# Patient Record
Sex: MALE | Race: BLACK OR AFRICAN AMERICAN | NOT HISPANIC OR LATINO | Employment: PART TIME | URBAN - METROPOLITAN AREA
[De-identification: names, ages, dates, MRNs, and addresses within clinical notes are randomized per-mention and may not be internally consistent; named-entity substitution may affect disease eponyms.]

---
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
## 2020-12-16 ENCOUNTER — LAB VISIT (OUTPATIENT)
Dept: LAB | Facility: HOSPITAL | Age: 20
End: 2020-12-16
Attending: PEDIATRICS
Payer: MEDICAID

## 2020-12-16 DIAGNOSIS — Z20.89 CONTACT WITH OR EXPOSURE TO POLIOMYELITIS: Primary | ICD-10-CM

## 2020-12-16 DIAGNOSIS — R53.83 FATIGUE: ICD-10-CM

## 2020-12-16 LAB
ALBUMIN SERPL BCP-MCNC: 4.9 G/DL (ref 3.5–5.2)
ALP SERPL-CCNC: 49 U/L (ref 55–135)
ALT SERPL W/O P-5'-P-CCNC: 15 U/L (ref 10–44)
ANION GAP SERPL CALC-SCNC: 9 MMOL/L (ref 8–16)
AST SERPL-CCNC: 19 U/L (ref 10–40)
BASOPHILS # BLD AUTO: 0.01 K/UL (ref 0–0.2)
BASOPHILS NFR BLD: 0.1 % (ref 0–1.9)
BILIRUB SERPL-MCNC: 1.6 MG/DL (ref 0.1–1)
BUN SERPL-MCNC: 10 MG/DL (ref 6–20)
CALCIUM SERPL-MCNC: 9.6 MG/DL (ref 8.7–10.5)
CHLORIDE SERPL-SCNC: 104 MMOL/L (ref 95–110)
CO2 SERPL-SCNC: 27 MMOL/L (ref 23–29)
CREAT SERPL-MCNC: 1.1 MG/DL (ref 0.5–1.4)
DIFFERENTIAL METHOD: ABNORMAL
EOSINOPHIL # BLD AUTO: 0 K/UL (ref 0–0.5)
EOSINOPHIL NFR BLD: 0 % (ref 0–8)
ERYTHROCYTE [DISTWIDTH] IN BLOOD BY AUTOMATED COUNT: 12.2 % (ref 11.5–14.5)
EST. GFR  (AFRICAN AMERICAN): >60 ML/MIN/1.73 M^2
EST. GFR  (NON AFRICAN AMERICAN): >60 ML/MIN/1.73 M^2
ESTIMATED AVG GLUCOSE: 105 MG/DL (ref 68–131)
GLUCOSE SERPL-MCNC: 102 MG/DL (ref 70–110)
HBA1C MFR BLD HPLC: 5.3 % (ref 4–5.6)
HCT VFR BLD AUTO: 47.8 % (ref 40–54)
HGB BLD-MCNC: 15.3 G/DL (ref 14–18)
IMM GRANULOCYTES # BLD AUTO: 0.03 K/UL (ref 0–0.04)
IMM GRANULOCYTES NFR BLD AUTO: 0.4 % (ref 0–0.5)
LYMPHOCYTES # BLD AUTO: 0.8 K/UL (ref 1–4.8)
LYMPHOCYTES NFR BLD: 11.8 % (ref 18–48)
MCH RBC QN AUTO: 28.5 PG (ref 27–31)
MCHC RBC AUTO-ENTMCNC: 32 G/DL (ref 32–36)
MCV RBC AUTO: 89 FL (ref 82–98)
MONOCYTES # BLD AUTO: 0.2 K/UL (ref 0.3–1)
MONOCYTES NFR BLD: 2.4 % (ref 4–15)
NEUTROPHILS # BLD AUTO: 6 K/UL (ref 1.8–7.7)
NEUTROPHILS NFR BLD: 85.3 % (ref 38–73)
NRBC BLD-RTO: 0 /100 WBC
PLATELET # BLD AUTO: 275 K/UL (ref 150–350)
PMV BLD AUTO: 9.1 FL (ref 9.2–12.9)
POTASSIUM SERPL-SCNC: 3.9 MMOL/L (ref 3.5–5.1)
PROT SERPL-MCNC: 7.9 G/DL (ref 6–8.4)
RBC # BLD AUTO: 5.37 M/UL (ref 4.6–6.2)
SODIUM SERPL-SCNC: 140 MMOL/L (ref 136–145)
WBC # BLD AUTO: 7.03 K/UL (ref 3.9–12.7)

## 2020-12-16 PROCEDURE — 36415 COLL VENOUS BLD VENIPUNCTURE: CPT

## 2020-12-16 PROCEDURE — 86696 HERPES SIMPLEX TYPE 2 TEST: CPT

## 2020-12-16 PROCEDURE — 86704 HEP B CORE ANTIBODY TOTAL: CPT

## 2020-12-16 PROCEDURE — 80053 COMPREHEN METABOLIC PANEL: CPT

## 2020-12-16 PROCEDURE — 86803 HEPATITIS C AB TEST: CPT

## 2020-12-16 PROCEDURE — 87086 URINE CULTURE/COLONY COUNT: CPT

## 2020-12-16 PROCEDURE — 87340 HEPATITIS B SURFACE AG IA: CPT

## 2020-12-16 PROCEDURE — 86694 HERPES SIMPLEX NES ANTBDY: CPT

## 2020-12-16 PROCEDURE — 83036 HEMOGLOBIN GLYCOSYLATED A1C: CPT

## 2020-12-16 PROCEDURE — 85025 COMPLETE CBC W/AUTO DIFF WBC: CPT

## 2020-12-16 PROCEDURE — 86703 HIV-1/HIV-2 1 RESULT ANTBDY: CPT

## 2020-12-16 PROCEDURE — 86592 SYPHILIS TEST NON-TREP QUAL: CPT

## 2020-12-17 LAB
HBV CORE AB SERPL QL IA: NEGATIVE
HBV SURFACE AG SERPL QL IA: NEGATIVE
HCV AB SERPL QL IA: NEGATIVE
HIV 1+2 AB+HIV1 P24 AG SERPL QL IA: NEGATIVE
RPR SER QL: NORMAL

## 2020-12-18 LAB
BACTERIA UR CULT: NO GROWTH
HSV1 IGG SERPL QL IA: NEGATIVE
HSV2 IGG SERPL QL IA: NEGATIVE

## 2020-12-21 LAB — HSV AB, IGM BY EIA: 0.22 INDEX

## 2022-01-06 ENCOUNTER — HOSPITAL ENCOUNTER (INPATIENT)
Facility: HOSPITAL | Age: 22
LOS: 5 days | Discharge: HOME | DRG: 885 | End: 2022-01-11
Attending: PSYCHIATRY & NEUROLOGY | Admitting: STUDENT IN AN ORGANIZED HEALTH CARE EDUCATION/TRAINING PROGRAM
Payer: MEDICAID

## 2022-01-06 ENCOUNTER — HOSPITAL ENCOUNTER (EMERGENCY)
Facility: HOSPITAL | Age: 22
Discharge: PSYCHIATRIC OR SUBSTANCE USE FACILITY | End: 2022-01-06
Attending: EMERGENCY MEDICINE
Payer: MEDICAID

## 2022-01-06 VITALS
TEMPERATURE: 99 F | BODY MASS INDEX: 26.07 KG/M2 | DIASTOLIC BLOOD PRESSURE: 87 MMHG | RESPIRATION RATE: 17 BRPM | SYSTOLIC BLOOD PRESSURE: 134 MMHG | OXYGEN SATURATION: 96 % | HEART RATE: 74 BPM | HEIGHT: 68 IN | WEIGHT: 172 LBS

## 2022-01-06 DIAGNOSIS — R45.851 SUICIDAL IDEATION: Primary | ICD-10-CM

## 2022-01-06 DIAGNOSIS — F32.A DEPRESSION, UNSPECIFIED DEPRESSION TYPE: ICD-10-CM

## 2022-01-06 DIAGNOSIS — R45.851 SUICIDAL IDEATION: ICD-10-CM

## 2022-01-06 LAB
ALBUMIN SERPL BCP-MCNC: 4.8 G/DL (ref 3.5–5.2)
ALP SERPL-CCNC: 54 U/L (ref 55–135)
ALT SERPL W/O P-5'-P-CCNC: 20 U/L (ref 10–44)
AMPHET+METHAMPHET UR QL: NEGATIVE
ANION GAP SERPL CALC-SCNC: 8 MMOL/L (ref 8–16)
APAP SERPL-MCNC: <3 UG/ML (ref 10–20)
AST SERPL-CCNC: 20 U/L (ref 10–40)
BACTERIA #/AREA URNS HPF: NORMAL /HPF
BARBITURATES UR QL SCN>200 NG/ML: NEGATIVE
BASOPHILS # BLD AUTO: 0.02 K/UL (ref 0–0.2)
BASOPHILS NFR BLD: 0.3 % (ref 0–1.9)
BENZODIAZ UR QL SCN>200 NG/ML: NEGATIVE
BILIRUB SERPL-MCNC: 1.5 MG/DL (ref 0.1–1)
BILIRUB UR QL STRIP: NEGATIVE
BUN SERPL-MCNC: 11 MG/DL (ref 6–20)
BZE UR QL SCN: NEGATIVE
CALCIUM SERPL-MCNC: 9.8 MG/DL (ref 8.7–10.5)
CANNABINOIDS UR QL SCN: ABNORMAL
CHLORIDE SERPL-SCNC: 106 MMOL/L (ref 95–110)
CLARITY UR: CLEAR
CO2 SERPL-SCNC: 27 MMOL/L (ref 23–29)
COLOR UR: YELLOW
CREAT SERPL-MCNC: 1 MG/DL (ref 0.5–1.4)
CREAT UR-MCNC: >450 MG/DL (ref 23–375)
CTP QC/QA: YES
DIFFERENTIAL METHOD: ABNORMAL
EOSINOPHIL # BLD AUTO: 0 K/UL (ref 0–0.5)
EOSINOPHIL NFR BLD: 0.1 % (ref 0–8)
ERYTHROCYTE [DISTWIDTH] IN BLOOD BY AUTOMATED COUNT: 12.4 % (ref 11.5–14.5)
EST. GFR  (AFRICAN AMERICAN): >60 ML/MIN/1.73 M^2
EST. GFR  (NON AFRICAN AMERICAN): >60 ML/MIN/1.73 M^2
ETHANOL SERPL-MCNC: <10 MG/DL
GLUCOSE SERPL-MCNC: 95 MG/DL (ref 70–110)
GLUCOSE UR QL STRIP: NEGATIVE
HCT VFR BLD AUTO: 43 % (ref 40–54)
HGB BLD-MCNC: 14 G/DL (ref 14–18)
HGB UR QL STRIP: NEGATIVE
HYALINE CASTS #/AREA URNS LPF: NORMAL /LPF
IMM GRANULOCYTES # BLD AUTO: 0.02 K/UL (ref 0–0.04)
IMM GRANULOCYTES NFR BLD AUTO: 0.3 % (ref 0–0.5)
KETONES UR QL STRIP: ABNORMAL
LEUKOCYTE ESTERASE UR QL STRIP: NEGATIVE
LYMPHOCYTES # BLD AUTO: 1.7 K/UL (ref 1–4.8)
LYMPHOCYTES NFR BLD: 24.9 % (ref 18–48)
MCH RBC QN AUTO: 28.9 PG (ref 27–31)
MCHC RBC AUTO-ENTMCNC: 32.6 G/DL (ref 32–36)
MCV RBC AUTO: 89 FL (ref 82–98)
METHADONE UR QL SCN>300 NG/ML: NEGATIVE
MICROSCOPIC COMMENT: NORMAL
MONOCYTES # BLD AUTO: 0.5 K/UL (ref 0.3–1)
MONOCYTES NFR BLD: 8 % (ref 4–15)
NEUTROPHILS # BLD AUTO: 4.5 K/UL (ref 1.8–7.7)
NEUTROPHILS NFR BLD: 66.4 % (ref 38–73)
NITRITE UR QL STRIP: NEGATIVE
NRBC BLD-RTO: 0 /100 WBC
OPIATES UR QL SCN: NEGATIVE
PCP UR QL SCN>25 NG/ML: NEGATIVE
PH UR STRIP: 6 [PH] (ref 5–8)
PLATELET # BLD AUTO: 400 K/UL (ref 150–450)
PMV BLD AUTO: 9.1 FL (ref 9.2–12.9)
POTASSIUM SERPL-SCNC: 4 MMOL/L (ref 3.5–5.1)
PROT SERPL-MCNC: 7.9 G/DL (ref 6–8.4)
PROT UR QL STRIP: ABNORMAL
RBC # BLD AUTO: 4.84 M/UL (ref 4.6–6.2)
RBC #/AREA URNS HPF: 3 /HPF (ref 0–4)
SARS-COV-2 RDRP RESP QL NAA+PROBE: NEGATIVE
SODIUM SERPL-SCNC: 141 MMOL/L (ref 136–145)
SP GR UR STRIP: >1.03 (ref 1–1.03)
TOXICOLOGY INFORMATION: ABNORMAL
TSH SERPL DL<=0.005 MIU/L-ACNC: 1.28 UIU/ML (ref 0.4–4)
URN SPEC COLLECT METH UR: ABNORMAL
UROBILINOGEN UR STRIP-ACNC: NEGATIVE EU/DL
WBC # BLD AUTO: 6.79 K/UL (ref 3.9–12.7)
WBC #/AREA URNS HPF: 2 /HPF (ref 0–5)

## 2022-01-06 PROCEDURE — U0002 COVID-19 LAB TEST NON-CDC: HCPCS | Performed by: EMERGENCY MEDICINE

## 2022-01-06 PROCEDURE — 25000003 PHARM REV CODE 250: Performed by: EMERGENCY MEDICINE

## 2022-01-06 PROCEDURE — 81000 URINALYSIS NONAUTO W/SCOPE: CPT | Mod: 59 | Performed by: EMERGENCY MEDICINE

## 2022-01-06 PROCEDURE — 99285 EMERGENCY DEPT VISIT HI MDM: CPT | Mod: 25

## 2022-01-06 PROCEDURE — 99205 PR OFFICE/OUTPT VISIT, NEW, LEVL V, 60-74 MIN: ICD-10-PCS | Mod: 95,,, | Performed by: STUDENT IN AN ORGANIZED HEALTH CARE EDUCATION/TRAINING PROGRAM

## 2022-01-06 PROCEDURE — 80053 COMPREHEN METABOLIC PANEL: CPT | Performed by: EMERGENCY MEDICINE

## 2022-01-06 PROCEDURE — 99205 OFFICE O/P NEW HI 60 MIN: CPT | Mod: 95,,, | Performed by: STUDENT IN AN ORGANIZED HEALTH CARE EDUCATION/TRAINING PROGRAM

## 2022-01-06 PROCEDURE — 80307 DRUG TEST PRSMV CHEM ANLYZR: CPT | Performed by: EMERGENCY MEDICINE

## 2022-01-06 PROCEDURE — 82077 ASSAY SPEC XCP UR&BREATH IA: CPT | Performed by: EMERGENCY MEDICINE

## 2022-01-06 PROCEDURE — 84443 ASSAY THYROID STIM HORMONE: CPT | Performed by: EMERGENCY MEDICINE

## 2022-01-06 PROCEDURE — 80143 DRUG ASSAY ACETAMINOPHEN: CPT | Performed by: EMERGENCY MEDICINE

## 2022-01-06 PROCEDURE — 11400000 HC PSYCH PRIVATE ROOM

## 2022-01-06 PROCEDURE — 85025 COMPLETE CBC W/AUTO DIFF WBC: CPT | Performed by: EMERGENCY MEDICINE

## 2022-01-06 RX ORDER — HYDROXYZINE PAMOATE 50 MG/1
50 CAPSULE ORAL EVERY 8 HOURS PRN
Status: DISCONTINUED | OUTPATIENT
Start: 2022-01-06 | End: 2022-01-07

## 2022-01-06 RX ORDER — LORAZEPAM 0.5 MG/1
1 TABLET ORAL
Status: COMPLETED | OUTPATIENT
Start: 2022-01-06 | End: 2022-01-06

## 2022-01-06 RX ORDER — ACETAMINOPHEN 325 MG/1
650 TABLET ORAL EVERY 6 HOURS PRN
Status: DISCONTINUED | OUTPATIENT
Start: 2022-01-06 | End: 2022-01-11 | Stop reason: HOSPADM

## 2022-01-06 RX ORDER — OLANZAPINE 10 MG/1
10 TABLET ORAL EVERY 8 HOURS PRN
Status: DISCONTINUED | OUTPATIENT
Start: 2022-01-06 | End: 2022-01-07

## 2022-01-06 RX ORDER — OLANZAPINE 10 MG/2ML
10 INJECTION, POWDER, FOR SOLUTION INTRAMUSCULAR EVERY 8 HOURS PRN
Status: DISCONTINUED | OUTPATIENT
Start: 2022-01-06 | End: 2022-01-07

## 2022-01-06 RX ORDER — MAG HYDROX/ALUMINUM HYD/SIMETH 200-200-20
30 SUSPENSION, ORAL (FINAL DOSE FORM) ORAL EVERY 6 HOURS PRN
Status: DISCONTINUED | OUTPATIENT
Start: 2022-01-06 | End: 2022-01-11 | Stop reason: HOSPADM

## 2022-01-06 RX ORDER — ADHESIVE BANDAGE
30 BANDAGE TOPICAL DAILY PRN
Status: DISCONTINUED | OUTPATIENT
Start: 2022-01-06 | End: 2022-01-11 | Stop reason: HOSPADM

## 2022-01-06 RX ORDER — LOPERAMIDE HYDROCHLORIDE 2 MG/1
2 CAPSULE ORAL 4 TIMES DAILY PRN
Status: DISCONTINUED | OUTPATIENT
Start: 2022-01-06 | End: 2022-01-11 | Stop reason: HOSPADM

## 2022-01-06 RX ADMIN — LORAZEPAM 1 MG: 0.5 TABLET ORAL at 10:01

## 2022-01-06 RX ADMIN — LORAZEPAM 1 MG: 0.5 TABLET ORAL at 06:01

## 2022-01-06 NOTE — ED NOTES
Pec was called and faxed to the [facility] office  pec was scanned to Patient chart in registration.

## 2022-01-06 NOTE — ED NOTES
Patient accepted by Melissa at [facility] for the service of Dr. Castro.       80 year old man with cardiac arrest, severe 2 vessel disease, optimizing medical management, compensation of CHF then plan multivessel percutaneous coronary interventions. Subsequent plan for ICD placement.

## 2022-01-06 NOTE — ED TRIAGE NOTES
"Pt reports being stress out over the past 2 months and having "anger outburst". Pt reports PTSD from past events. No auditory or visual hallucinations at this time but states he does have auditory hallucination intermittently. No SI or HI. Pt intermittent reports right sided numbness and h/a when he gets these "stressed triggers". No previous psychiatric hospitalization reported.   "

## 2022-01-06 NOTE — ED PROVIDER NOTES
"Encounter Date: 1/6/2022    SCRIBE #1 NOTE: I, Surya Ma, am scribing for, and in the presence of,  Collin Pacheco MD. I have scribed the following portions of the note - Other sections scribed: HPI, ROS.       History     Chief Complaint   Patient presents with    Psychiatric Evaluation     Pt reports being stress out over the past 2 months and having "anger outburst". Pt reports PTSD from past events. No auditory or visual hallucinations at this time. No SI or HI. Pt intermittent reports right sided numbness and h/a when he gets these "stressed triggers".      CC: Psychiatric evaluation    HPI: This is a 21 y.o. M who has no PMHx who presents to the ED for psychiatric evaluation. Pt currently complains of a headache. He is unable to describe the headache. He admits to CP. Pt states that he is always depressed. He reports a Hx of suicidal ideation without a plan and no attempt. There are no weapons in his home. He is not on psychiatric medications, nor does he have a psychiatric history, or Hx of psychiatric hospitalization. He reports marijuana use. Pt denies hallucinations, rash, swelling, homicidal ideation, tobacco use, or alcohol use.    The history is provided by the patient. No  was used.     Review of patient's allergies indicates:  No Known Allergies  Past Medical History:   Diagnosis Date    Otitis      No past surgical history on file.  Family History   Problem Relation Age of Onset    No Known Problems Mother     No Known Problems Father      Social History     Tobacco Use    Smoking status: Current Some Day Smoker    Smokeless tobacco: Never Used    Tobacco comment: Vaccinations up to date. Patient is in 6th grade.    Substance Use Topics    Alcohol use: No    Drug use: No     Review of Systems   Constitutional: Negative for fever.   HENT: Negative for sore throat.    Respiratory: Negative for cough and shortness of breath.    Cardiovascular: Positive for chest " pain. Negative for leg swelling.   Gastrointestinal: Negative for abdominal pain, diarrhea, nausea and vomiting.   Genitourinary: Negative for dysuria.   Musculoskeletal: Negative for back pain and joint swelling.   Skin: Negative for rash.   Neurological: Positive for headaches. Negative for weakness.   Hematological: Does not bruise/bleed easily.   Psychiatric/Behavioral: Positive for dysphoric mood and suicidal ideas. Negative for hallucinations.        (-) Homicidal ideation       Physical Exam     Initial Vitals [01/06/22 0954]   BP Pulse Resp Temp SpO2   (!) 145/74 99 20 98.7 °F (37.1 °C) 100 %      MAP       --         Physical Exam    Nursing note and vitals reviewed.  Constitutional: He appears well-developed and well-nourished. He is not diaphoretic. He appears distressed.   HENT:   Head: Normocephalic and atraumatic.   Right Ear: External ear normal.   Left Ear: External ear normal.   Nose: Nose normal.   Mouth/Throat: Oropharynx is clear and moist.   Eyes: Conjunctivae and EOM are normal. Pupils are equal, round, and reactive to light. Right eye exhibits no discharge. Left eye exhibits no discharge. No scleral icterus.   Neck: Neck supple. No JVD present.   Normal range of motion.  Cardiovascular: Normal rate, regular rhythm, normal heart sounds and intact distal pulses. Exam reveals no gallop and no friction rub.    No murmur heard.  Pulmonary/Chest: Breath sounds normal. No stridor. No respiratory distress. He has no wheezes. He has no rhonchi. He has no rales. He exhibits no tenderness.   Abdominal: Abdomen is soft. Bowel sounds are normal. He exhibits no distension and no mass. There is no abdominal tenderness. There is no rebound and no guarding.   Musculoskeletal:         General: No tenderness or edema. Normal range of motion.      Cervical back: Normal range of motion and neck supple.     Neurological: He is alert and oriented to person, place, and time. He has normal strength. No cranial nerve  "deficit or sensory deficit.   Skin: Skin is warm and dry. No rash noted. No erythema. No pallor.   Psychiatric:   The patient has his head in his hands and is crying.  He is slow to answer questions but is cooperative.  He states he has suicidal ideation but no specific plan.  He states he has no homicidal ideation.  He does not appear to be responding to internal stimuli.  He admits to possible auditory hallucinations.  No visible hallucinations.         ED Course   Procedures  Labs Reviewed   CBC W/ AUTO DIFFERENTIAL - Abnormal; Notable for the following components:       Result Value    MPV 9.1 (*)     All other components within normal limits   COMPREHENSIVE METABOLIC PANEL - Abnormal; Notable for the following components:    Total Bilirubin 1.5 (*)     Alkaline Phosphatase 54 (*)     All other components within normal limits   URINALYSIS, REFLEX TO URINE CULTURE - Abnormal; Notable for the following components:    Specific Gravity, UA >1.030 (*)     Protein, UA 1+ (*)     Ketones, UA 1+ (*)     All other components within normal limits    Narrative:     Specimen Source->Urine   DRUG SCREEN PANEL, URINE EMERGENCY - Abnormal; Notable for the following components:    THC Presumptive Positive (*)     Creatinine, Urine >450.0 (*)     All other components within normal limits    Narrative:     Specimen Source->Urine   ACETAMINOPHEN LEVEL - Abnormal; Notable for the following components:    Acetaminophen (Tylenol), Serum <3.0 (*)     All other components within normal limits   TSH   ALCOHOL,MEDICAL (ETHANOL)   URINALYSIS MICROSCOPIC    Narrative:     Specimen Source->Urine   SARS-COV-2 RDRP GENE          Imaging Results    None          Medications   LORazepam tablet 1 mg (1 mg Oral Given 1/6/22 1023)     Medical Decision Making:   ED Management:  This is the emergent evaluation of a 21-year-old male presents emergency department for evaluation of feeling as though "my head hurts ".  By this, he means that he feels " that he is in mental health crisis.  Differential diagnosis at the time of initial evaluation included, but was not limited to:  Primary psychiatric disorder with acute decompensation, metabolic disturbance, withdrawal syndrome, intoxication.  Patient has active suicidal ideation.  He also appears to possibly be experiencing auditory hallucinations.  This may be new onset psychosis.  Given the above, I did give the patient some Ativan as he stated he was anxious and appeared anxious.  He has been very cooperative.  He is medically cleared for psychiatric evaluation at this time.  There is no evidence of infection, metabolic derangement, or intoxication.  His drug screen is positive for THC, which he admits to smoking regularly.          Scribe Attestation:   Scribe #1: I performed the above scribed service and the documentation accurately describes the services I performed. I attest to the accuracy of the note.               I, Collin Pacheco MD, personally performed the services described in this documentation. All medical record entries made by the scribe were at my direction and in my presence. I have reviewed the chart and agree that the record reflects my personal performance and is accurate and complete.  Clinical Impression:   Final diagnoses:  [R45.851] Suicidal ideation (Primary)  [F32.A] Depression, unspecified depression type          ED Disposition Condition    Transfer to Psych Facility         ED Prescriptions     None        Follow-up Information    None          Collin Pacheco Jr., MD  01/06/22 8028

## 2022-01-06 NOTE — ED NOTES
Contact # for pt:  1. Mother, Lissette Sen, @ [phone]  2.Girlfriend, Flacaantonietta Whitley @ [phone]  3. Sister - Dillon Sen @ [phone]

## 2022-01-07 PROBLEM — F32.A DEPRESSION: Status: ACTIVE | Noted: 2022-01-07

## 2022-01-07 LAB
CHOLEST SERPL-MCNC: 123 MG/DL (ref 120–199)
CHOLEST/HDLC SERPL: 2.8 {RATIO} (ref 2–5)
ESTIMATED AVG GLUCOSE: 117 MG/DL (ref 68–131)
HBA1C MFR BLD: 5.7 % (ref 4–5.6)
HDLC SERPL-MCNC: 44 MG/DL (ref 40–75)
HDLC SERPL: 35.8 % (ref 20–50)
LDLC SERPL CALC-MCNC: 62.2 MG/DL (ref 63–159)
NONHDLC SERPL-MCNC: 79 MG/DL
TRIGL SERPL-MCNC: 84 MG/DL (ref 30–150)

## 2022-01-07 PROCEDURE — S4991 NICOTINE PATCH NONLEGEND: HCPCS | Performed by: STUDENT IN AN ORGANIZED HEALTH CARE EDUCATION/TRAINING PROGRAM

## 2022-01-07 PROCEDURE — 36415 COLL VENOUS BLD VENIPUNCTURE: CPT | Performed by: PSYCHIATRY & NEUROLOGY

## 2022-01-07 PROCEDURE — 80061 LIPID PANEL: CPT | Performed by: PSYCHIATRY & NEUROLOGY

## 2022-01-07 PROCEDURE — 90833 PSYTX W PT W E/M 30 MIN: CPT | Mod: ,,, | Performed by: STUDENT IN AN ORGANIZED HEALTH CARE EDUCATION/TRAINING PROGRAM

## 2022-01-07 PROCEDURE — 90833 PR PSYCHOTHERAPY W/PATIENT W/E&M, 30 MIN (ADD ON): ICD-10-PCS | Mod: ,,, | Performed by: STUDENT IN AN ORGANIZED HEALTH CARE EDUCATION/TRAINING PROGRAM

## 2022-01-07 PROCEDURE — 11400000 HC PSYCH PRIVATE ROOM

## 2022-01-07 PROCEDURE — 25000003 PHARM REV CODE 250: Performed by: STUDENT IN AN ORGANIZED HEALTH CARE EDUCATION/TRAINING PROGRAM

## 2022-01-07 PROCEDURE — 99223 1ST HOSP IP/OBS HIGH 75: CPT | Mod: ,,, | Performed by: STUDENT IN AN ORGANIZED HEALTH CARE EDUCATION/TRAINING PROGRAM

## 2022-01-07 PROCEDURE — 99223 PR INITIAL HOSPITAL CARE,LEVL III: ICD-10-PCS | Mod: ,,, | Performed by: STUDENT IN AN ORGANIZED HEALTH CARE EDUCATION/TRAINING PROGRAM

## 2022-01-07 PROCEDURE — 83036 HEMOGLOBIN GLYCOSYLATED A1C: CPT | Performed by: PSYCHIATRY & NEUROLOGY

## 2022-01-07 RX ORDER — LORAZEPAM 1 MG/1
2 TABLET ORAL EVERY 4 HOURS PRN
Status: DISCONTINUED | OUTPATIENT
Start: 2022-01-07 | End: 2022-01-07

## 2022-01-07 RX ORDER — DOCUSATE SODIUM 100 MG/1
100 CAPSULE, LIQUID FILLED ORAL DAILY PRN
Status: DISCONTINUED | OUTPATIENT
Start: 2022-01-07 | End: 2022-01-11 | Stop reason: HOSPADM

## 2022-01-07 RX ORDER — DIPHENHYDRAMINE HCL 50 MG
50 CAPSULE ORAL EVERY 4 HOURS PRN
Status: DISCONTINUED | OUTPATIENT
Start: 2022-01-07 | End: 2022-01-07

## 2022-01-07 RX ORDER — IBUPROFEN 200 MG
1 TABLET ORAL DAILY
Status: DISCONTINUED | OUTPATIENT
Start: 2022-01-07 | End: 2022-01-08

## 2022-01-07 RX ORDER — DIPHENHYDRAMINE HYDROCHLORIDE 50 MG/ML
50 INJECTION INTRAMUSCULAR; INTRAVENOUS EVERY 4 HOURS PRN
Status: DISCONTINUED | OUTPATIENT
Start: 2022-01-07 | End: 2022-01-07

## 2022-01-07 RX ORDER — ESCITALOPRAM OXALATE 5 MG/1
5 TABLET ORAL DAILY
Status: DISCONTINUED | OUTPATIENT
Start: 2022-01-07 | End: 2022-01-08

## 2022-01-07 RX ORDER — LORAZEPAM 2 MG/ML
2 INJECTION INTRAMUSCULAR EVERY 4 HOURS PRN
Status: DISCONTINUED | OUTPATIENT
Start: 2022-01-07 | End: 2022-01-07

## 2022-01-07 RX ORDER — IBUPROFEN 200 MG
1 TABLET ORAL DAILY PRN
Status: DISCONTINUED | OUTPATIENT
Start: 2022-01-07 | End: 2022-01-07

## 2022-01-07 RX ORDER — HALOPERIDOL 5 MG/1
5 TABLET ORAL 3 TIMES DAILY
Status: DISCONTINUED | OUTPATIENT
Start: 2022-01-07 | End: 2022-01-07

## 2022-01-07 RX ORDER — HALOPERIDOL 5 MG/ML
5 INJECTION INTRAMUSCULAR EVERY 4 HOURS PRN
Status: DISCONTINUED | OUTPATIENT
Start: 2022-01-07 | End: 2022-01-07

## 2022-01-07 RX ORDER — FOLIC ACID 1 MG/1
1 TABLET ORAL DAILY
Status: DISCONTINUED | OUTPATIENT
Start: 2022-01-07 | End: 2022-01-11 | Stop reason: HOSPADM

## 2022-01-07 RX ADMIN — NICOTINE 1 PATCH: 21 PATCH, EXTENDED RELEASE TRANSDERMAL at 08:01

## 2022-01-07 RX ADMIN — FOLIC ACID 1 MG: 1 TABLET ORAL at 08:01

## 2022-01-07 RX ADMIN — HALOPERIDOL 5 MG: 5 TABLET ORAL at 08:01

## 2022-01-07 RX ADMIN — THERA TABS 1 TABLET: TAB at 08:01

## 2022-01-07 RX ADMIN — ESCITALOPRAM 5 MG: 5 TABLET, FILM COATED ORAL at 10:01

## 2022-01-07 NOTE — H&P
"[facility] Hospital Medicine  History & Physical    Patient Name: Casey Sen III  MRN: [id number]  Patient Class: IP- Psych  Admission Date: 1/6/2022  Attending Physician: Deniz Castro MD   Primary Care Provider: Deniz Gomez MD         Patient information was obtained from ER records.     Subjective:     Principal Problem:<principal problem not specified>    Chief Complaint: No chief complaint on file.       HPI:   Chief Complaint   Patient presents with    Psychiatric Evaluation       Pt reports being stress out over the past 2 months and having "anger outburst". Pt reports PTSD from past events. No auditory or visual hallucinations at this time. No SI or HI. Pt intermittent reports right sided numbness and h/a when he gets these "stressed triggers".       CC: Psychiatric evaluation     HPI: This is a 21 y.o. M who has no PMHx who presents to the ED for psychiatric evaluation. Pt currently complains of a headache. He is unable to describe the headache. He admits to CP. Pt states that he is always depressed. He reports a Hx of suicidal ideation without a plan and no attempt. There are no weapons in his home. He is not on psychiatric medications, nor does he have a psychiatric history, or Hx of psychiatric hospitalization. He reports marijuana use. Pt denies hallucinations, rash, swelling, homicidal ideation, tobacco use, or alcohol use.     The history is provided by the patient. No  was used.       Past Medical History:   Diagnosis Date    Otitis        No past surgical history on file.    Review of patient's allergies indicates:  No Known Allergies    Current Facility-Administered Medications on File Prior to Encounter   Medication    [COMPLETED] LORazepam tablet 1 mg     Current Outpatient Medications on File Prior to Encounter   Medication Sig    ibuprofen (ADVIL,MOTRIN) 600 MG tablet Take 1 tablet (600 mg total) by mouth every 6 (six) hours " as needed.    ondansetron (ZOFRAN) 4 MG tablet Take 1 tablet (4 mg total) by mouth every 8 (eight) hours as needed for Nausea.     Family History     Problem Relation (Age of Onset)    No Known Problems Mother, Father        Tobacco Use    Smoking status: Current Some Day Smoker    Smokeless tobacco: Never Used    Tobacco comment: Vaccinations up to date. Patient is in 6th grade.    Substance and Sexual Activity    Alcohol use: No    Drug use: No    Sexual activity: Never     Review of Systems   Constitutional: Negative for chills and fever.   HENT: Negative for rhinorrhea, sneezing and sore throat.    Eyes: Negative for pain and visual disturbance.   Respiratory: Negative for cough and shortness of breath.    Cardiovascular: Negative for chest pain.   Gastrointestinal: Negative for abdominal pain, constipation and diarrhea.   Endocrine: Negative for cold intolerance and heat intolerance.   Genitourinary: Negative for difficulty urinating.   Musculoskeletal: Negative for arthralgias and joint swelling.   Skin: Negative for rash.   Allergic/Immunologic: Negative for environmental allergies.   Neurological: Positive for headaches. Negative for dizziness, syncope and weakness.   Hematological: Does not bruise/bleed easily.   Psychiatric/Behavioral: Positive for dysphoric mood and suicidal ideas. The patient is not nervous/anxious.      Objective:     Vital Signs (Most Recent):  Temp: 99.5 °F (37.5 °C) (01/07/22 1300)  Pulse: 74 (01/07/22 1300)  Resp: 20 (01/07/22 1300)  BP: 129/87 (01/07/22 1300)  SpO2: 100 % (01/07/22 0700) Vital Signs (24h Range):  Temp:  [98.5 °F (36.9 °C)-99.5 °F (37.5 °C)] 99.5 °F (37.5 °C)  Pulse:  [71-79] 74  Resp:  [17-22] 20  SpO2:  [96 %-100 %] 100 %  BP: (117-134)/(67-87) 129/87     Weight: 78 kg (171 lb 15.3 oz)  Body mass index is 26.15 kg/m².    Physical Exam  Vitals and nursing note reviewed.   Constitutional:       Appearance: Normal appearance.   HENT:      Head: Normocephalic  and atraumatic.      Nose: Nose normal.      Mouth/Throat:      Mouth: Mucous membranes are moist.      Pharynx: Oropharynx is clear.   Eyes:      Extraocular Movements: Extraocular movements intact.      Conjunctiva/sclera: Conjunctivae normal.      Pupils: Pupils are equal, round, and reactive to light.   Cardiovascular:      Rate and Rhythm: Normal rate and regular rhythm.      Pulses: Normal pulses.      Heart sounds: Normal heart sounds.   Pulmonary:      Effort: Pulmonary effort is normal.      Breath sounds: Normal breath sounds.   Abdominal:      General: Abdomen is flat. Bowel sounds are normal.      Palpations: Abdomen is soft.   Musculoskeletal:         General: Normal range of motion.      Cervical back: Normal range of motion and neck supple.   Skin:     General: Skin is warm and dry.      Capillary Refill: Capillary refill takes less than 2 seconds.      Comments: No rashes on limited skin exam.   Neurological:      General: No focal deficit present.      Mental Status: He is alert and oriented to person, place, and time.      Cranial Nerves: No cranial nerve deficit.      Comments: I Olfactory:  Sense of smell intact    II Optic:  Pupils equal round react to light.  Vision intact.    III, IV, VI, Ocular motor, Trochlear, Abducens:  Extraocular movements intact    V Trigeminal:  Facial sensation intact facial sensation intact,, muscles of mastication intact muscles of mastication intact, corneal reflex intact, corneal reflex intact    VII Facial:  Muscles of facial expression intact     VIII Vestibular cochlear: Hearing intact vestibular cochlear: Hearing intact    IX Glossopharyngeal:  Gag reflex intact.  Tasting intact.     X Vagus:  Gag reflex intact.    XI Spinal Accessory:  Shoulder shrug intact.  Head rotation intact.    XII Hypoglossal:  Tongue movements intact.     Psychiatric:      Comments: Patient appears depressed.  Endorses suicidal ideation without a plan.  No hallucinations            CRANIAL NERVES     CN III, IV, VI   Pupils are equal, round, and reactive to light.       Significant Labs: All pertinent labs within the past 24 hours have been reviewed.    Significant Imaging: I have reviewed all pertinent imaging results/findings within the past 24 hours.    Assessment/Plan:     Depression  To be admitted to our inpatient psychiatric unit for further evaluation and management.        VTE Risk Mitigation (From admission, onward)    None             Esvin Quevedo Jr, MD  Department of Hospital Medicine   [facility])

## 2022-01-07 NOTE — PLAN OF CARE
Collateral: Lissette Sen (Mother) [phone]    Patient's mom states that she's unsure what exact issues have been occurring with her son. States that patient would have outburst randomly where he would scream to the top of his lungs, being disrespectful, and random outburst which are not patient's normal behaviors. States that patient has said that he feels that he has two different personalities. Recently encouraged by sister to go get help with inpatient hospitalization.     Patient has made recent threats of suicidal thought stating that he no longer wanted to live anymore.     Patient diagnosed with ADHD and dyslexia.   Patient becomes defensive when there are conversations with parents where he feels that they are unable to understand him. Frequent arguments in home and would feel that family is against him.     No certain pattern of outburst, it would just happen out of blue. Emotionally unstable during these times due to apologizing for things then arguing about same thing during arguments.     Plan  Patient will follow up with mental health in [facility]. Will need transportation.

## 2022-01-07 NOTE — SUBJECTIVE & OBJECTIVE
Past Medical History:   Diagnosis Date    Otitis        No past surgical history on file.    Review of patient's allergies indicates:  No Known Allergies    Current Facility-Administered Medications on File Prior to Encounter   Medication    [COMPLETED] LORazepam tablet 1 mg     Current Outpatient Medications on File Prior to Encounter   Medication Sig    ibuprofen (ADVIL,MOTRIN) 600 MG tablet Take 1 tablet (600 mg total) by mouth every 6 (six) hours as needed.    ondansetron (ZOFRAN) 4 MG tablet Take 1 tablet (4 mg total) by mouth every 8 (eight) hours as needed for Nausea.     Family History     Problem Relation (Age of Onset)    No Known Problems Mother, Father        Tobacco Use    Smoking status: Current Some Day Smoker    Smokeless tobacco: Never Used    Tobacco comment: Vaccinations up to date. Patient is in 6th grade.    Substance and Sexual Activity    Alcohol use: No    Drug use: No    Sexual activity: Never     Review of Systems   Constitutional: Negative for chills and fever.   HENT: Negative for rhinorrhea, sneezing and sore throat.    Eyes: Negative for pain and visual disturbance.   Respiratory: Negative for cough and shortness of breath.    Cardiovascular: Negative for chest pain.   Gastrointestinal: Negative for abdominal pain, constipation and diarrhea.   Endocrine: Negative for cold intolerance and heat intolerance.   Genitourinary: Negative for difficulty urinating.   Musculoskeletal: Negative for arthralgias and joint swelling.   Skin: Negative for rash.   Allergic/Immunologic: Negative for environmental allergies.   Neurological: Positive for headaches. Negative for dizziness, syncope and weakness.   Hematological: Does not bruise/bleed easily.   Psychiatric/Behavioral: Positive for dysphoric mood and suicidal ideas. The patient is not nervous/anxious.      Objective:     Vital Signs (Most Recent):  Temp: 99.5 °F (37.5 °C) (01/07/22 1300)  Pulse: 74 (01/07/22 1300)  Resp: 20 (01/07/22  1300)  BP: 129/87 (01/07/22 1300)  SpO2: 100 % (01/07/22 0700) Vital Signs (24h Range):  Temp:  [98.5 °F (36.9 °C)-99.5 °F (37.5 °C)] 99.5 °F (37.5 °C)  Pulse:  [71-79] 74  Resp:  [17-22] 20  SpO2:  [96 %-100 %] 100 %  BP: (117-134)/(67-87) 129/87     Weight: 78 kg (171 lb 15.3 oz)  Body mass index is 26.15 kg/m².    Physical Exam  Vitals and nursing note reviewed.   Constitutional:       Appearance: Normal appearance.   HENT:      Head: Normocephalic and atraumatic.      Nose: Nose normal.      Mouth/Throat:      Mouth: Mucous membranes are moist.      Pharynx: Oropharynx is clear.   Eyes:      Extraocular Movements: Extraocular movements intact.      Conjunctiva/sclera: Conjunctivae normal.      Pupils: Pupils are equal, round, and reactive to light.   Cardiovascular:      Rate and Rhythm: Normal rate and regular rhythm.      Pulses: Normal pulses.      Heart sounds: Normal heart sounds.   Pulmonary:      Effort: Pulmonary effort is normal.      Breath sounds: Normal breath sounds.   Abdominal:      General: Abdomen is flat. Bowel sounds are normal.      Palpations: Abdomen is soft.   Musculoskeletal:         General: Normal range of motion.      Cervical back: Normal range of motion and neck supple.   Skin:     General: Skin is warm and dry.      Capillary Refill: Capillary refill takes less than 2 seconds.      Comments: No rashes on limited skin exam.   Neurological:      General: No focal deficit present.      Mental Status: He is alert and oriented to person, place, and time.      Cranial Nerves: No cranial nerve deficit.      Comments: I Olfactory:  Sense of smell intact    II Optic:  Pupils equal round react to light.  Vision intact.    III, IV, VI, Ocular motor, Trochlear, Abducens:  Extraocular movements intact    V Trigeminal:  Facial sensation intact facial sensation intact,, muscles of mastication intact muscles of mastication intact, corneal reflex intact, corneal reflex intact    VII Facial:  Muscles  of facial expression intact     VIII Vestibular cochlear: Hearing intact vestibular cochlear: Hearing intact    IX Glossopharyngeal:  Gag reflex intact.  Tasting intact.     X Vagus:  Gag reflex intact.    XI Spinal Accessory:  Shoulder shrug intact.  Head rotation intact.    XII Hypoglossal:  Tongue movements intact.     Psychiatric:      Comments: Patient appears depressed.  Endorses suicidal ideation without a plan.  No hallucinations           CRANIAL NERVES     CN III, IV, VI   Pupils are equal, round, and reactive to light.       Significant Labs: All pertinent labs within the past 24 hours have been reviewed.    Significant Imaging: I have reviewed all pertinent imaging results/findings within the past 24 hours.

## 2022-01-07 NOTE — H&P
"PSYCHIATRY INPATIENT ADMISSION NOTE - H & P    The patient location is: [facility]    Visit type: audiovisual    Face to Face time with patient: 30  50 minutes of total time spent on the encounter, which includes face to face time and non-face to face time preparing to see the patient (eg, review of tests), Obtaining and/or reviewing separately obtained history, Documenting clinical information in the electronic or other health record, Independently interpreting results (not separately reported) and communicating results to the patient/family/caregiver, or Care coordination (not separately reported).     Each patient to whom he or she provides medical services by telemedicine is:  (1) informed of the relationship between the physician and patient and the respective role of any other health care provider with respect to management of the patient; and (2) notified that he or she may decline to receive medical services by telemedicine and may withdraw from such care at any time.            1/7/2022 9:32 AM   Casey Sen III   2000   [id number]         DATE OF ADMISSION: 1/6/2022  9:30 PM    SITE: [facility]    CURRENT LEGAL STATUS: PEC and/or CEC      HISTORY    CHIEF COMPLAINT   Casey Sen III is a 21 y.o. male with a past psychiatric history of unknown currently admitted to the inpatient unit with the following chief complaint: depression and thoughts of death/suicide    HPI   The patient was seen and examined. The chart was reviewed.    The patient presented to the ER on 1/6/2022 with complaints of depression    The patient was medically cleared and admitted to the U.    Per ED MD:  Pt reports being stress out over the past 2 months and having "anger outburst". Pt reports PTSD from past events. No auditory or visual hallucinations at this time. No SI or HI. Pt intermittent reports right sided numbness and h/a when he gets these "stressed triggers".       CC: Psychiatric evaluation     HPI: " "This is a 21 y.o. M who has no PMHx who presents to the ED for psychiatric evaluation. Pt currently complains of a headache. He is unable to describe the headache. He admits to CP. Pt states that he is always depressed. He reports a Hx of suicidal ideation without a plan and no attempt. There are no weapons in his home. He is not on psychiatric medications, nor does he have a psychiatric history, or Hx of psychiatric hospitalization. He reports marijuana use. Pt denies hallucinations, rash, swelling, homicidal ideation, tobacco use, or alcohol use.     The history is provided by the patient. No  was used.     This is the emergent evaluation of a 21-year-old male presents emergency department for evaluation of feeling as though "my head hurts ".  By this, he means that he feels that he is in mental health crisis.  Differential diagnosis at the time of initial evaluation included, but was not limited to:  Primary psychiatric disorder with acute decompensation, metabolic disturbance, withdrawal syndrome, intoxication.  Patient has active suicidal ideation.  He also appears to possibly be experiencing auditory hallucinations.  This may be new onset psychosis.  Given the above, I did give the patient some Ativan as he stated he was anxious and appeared anxious.  He has been very cooperative.  He is medically cleared for psychiatric evaluation at this time.  There is no evidence of infection, metabolic derangement, or intoxication.  His drug screen is positive for THC, which he admits to smoking regularly.        Per psychiatric consult MD:  ED consult "This is a 21 y.o. M who has no PMHx who presents to the ED for psychiatric evaluation. Pt currently complains of a headache. He is unable to describe the headache. He admits to CP. Pt states that he is always depressed. He reports a Hx of suicidal ideation without a plan and no attempt. There are no weapons in his home. He is not on psychiatric " "medications, nor does he have a psychiatric history, or Hx of psychiatric hospitalization. He reports marijuana use. Pt denies hallucinations, rash, swelling, homicidal ideation, tobacco use, or alcohol use."     Pt was provided with PO ativan for panic symptoms prior to my eval.     On interview (cut short as emergency transport arrived to collect pt to accepted inpatient unit)-  "For a long time I haven't been right, depressed and stuff. Someone said once I have two personalities."     "I have a spot in my head that triggers off- it hurts bad."   "I have felt depressed for a long time. I've never been suicidal, but I've been angry for a long time." No HI  Says he is bothered by his own voice in his head, thoughts that tell him things-- he becomes extremely tearful at this time and terminates interview.      Per RN:  As per transferring facility, 20 y/o male reports being stressed out over the past 2 months and having "anger outburst". Pt reports PTSD from past events. Events unknown.  No auditory or visual hallucinations at this time. No SI or HI. Pt has intermittent reports right sided numbness and h/a when he gets these "stressed triggers.  No PMHx.   Pt currently complains of a headache. He is unable to describe the headache. Pt states that he is always depressed. He reports a Hx of suicidal ideation without a plan and no attempt.  He is not on psychiatric medications, nor does he have a psychiatric history, or Hx of psychiatric hospitalization. UDS positive for marijuana.  Patient accepted by Dr. ANKITA Castro at [facility].       Upon arrival to [facility], Patient is calm and cooperative.  Alert and oriented.  No complaints of pain or discomfort.  Patient able to answer questions but reluctant to talk.  Patient asked to shower and refused a snack.  Patient very teary eyed when talking to staff.  Will not discuss his problems.  Patient has receipt in wallet for a gun with picture of gun on phone.  Patient oriented to " "room and went to sleep.  No concerns.            Psychiatric interview:  "I just don't feel right," patient immediately tearful, crying, "it's just too much, I got a lot going on, I have a lot of problems." He states "I been overthinking, I feel like I have two personalities, I'm stuck in my thoughts talking to myself.... it's hard for me to be around people."      Symptoms of Depression: diminished mood - Yes, loss of interest/anhedonia - Yes;  recurrent - Yes, >14 days - Yes, diminished energy - Yes, change in sleep - Yes, change in appetite - Yes, diminished concentration or cognition or indecisiveness - Yes, PMA/R -  Yes, excessive guilt or hopelessness or worthlessness - Yes, suicidal ideations - Yes    Changes in Sleep: trouble with initiation- Yes, maintenance, - Yes early morning awakening with inability to return to sleep - No, hypersomnolence - No    Suicidal- active/passive ideations - Yes, organized plans, future intentions - No    Homicidal ideations: active/passive ideations - No, organized plans, future intentions - No    Symptoms of psychosis: hallucinations - Yes, delusions - No, disorganized speech - No, disorganized behavior or abnormal motor behavior - No, or negative symptoms (diminshed emotional expression, avolition, anhedonia, alogia, asociality) - No, active phase symptoms >1 month - No, continuous signs of illness > 6 months - No, since onset of illness decreased level of functioning present - No    Symptoms of nury or hypomania: elevated, expansive, or irritable mood with increased energy or activity - No; > 4 days - No,  >7 days - No; with inflated self-esteem or grandiosity - No, decreased need for sleep - No, increased rate of speech - No, FOI or racing thoughts - No, distractibility - No, increased goal directed activity or PMA - No, risky/disinhibited behavior - No    Symptoms of TAMAR: excessive anxiety/worry/fear, more days than not, about numerous issues - Yes, ongoing for >6 months " "- Yes, difficult to control - Yes, with restlessness - Yes, fatigue - Yes, poor concentration - Yes, irritability - Yes, muscle tension - Yes, sleep disturbance - Yes; causes functionally impairing distress - Yes    Symptoms of Panic Disorder: recurrent panic attacks (palpitations/heart racing, sweating, shakiness, dyspnea, choking, chest pain/discomfort, Gi symptoms, dizzy/lightheadedness, hot/col flashes, paresthesias, derealization, fear of losing control or fear of dying or fear of "going crazy") - No, precipitated - No, un-precipitated - No, source of worry and/or behavioral changes secondary for 1 month or longer- No, agoraphobia - No    Symptoms of PTSD: h/o trauma exposure - Yes; re-experiencing/intrusive symptoms - No     Symptoms of OCD: obsessions (recurrent thoughts/urges/images; intrusive and/or unwanted; uses other thoughts/actions to suppress) - No; compulsions (repetitive behaviors used to lower distress/anxiety/obsessions) - No, time-consuming (over 1 hour per day) or cause significant distress/impairment - - No    Symptoms of Anorexia: restriction of caloric intake leading to significantly low body weight - No, intense fear of gaining weight or persistent behavior that interferes with weight gain even thought at a significantly low weight - No, disturbance in the way in which one's body weight or shape is experienced, undue influence of body weight or shape on self evaluation, or persistent lack of recognition of the seriousness of the current low body weight - No    Symptoms of Bulimia: recurrent episodes of binge eating (definitely larger amount  than what others would eat and lack of a sense of control over eating during episode) - No, recurrent inappropriate compensatory behaviors in order to prevent weight gain (fasting, medications, exercise, vomiting) - No, binges and compensatory behaviors both occur on average at least once a week for 3 months - No, self evaluations is unduly influenced by " "body shape/weight- - No          Psychotherapy:  · Target symptoms: depression  · Why chosen therapy is appropriate versus another modality: relevant to diagnosis  · Outcome monitoring methods: self-report  · Therapeutic intervention type: supportive psychotherapy  · Topics discussed/themes: educational, building skills sets for symptom management, symptom recognition  · The patient's response to the intervention is accepting. The patient's progress toward treatment goals is fair.   · Duration of intervention: 16 minutes.        PAST PSYCHIATRIC HISTORY  Previous Psychiatric Hospitalizations: No  Previous SI/HI: No,  Previous Suicide Attempts: No,   Previous Medication Trials: No,  Psychiatric Care (current & past): No,  History of Psychotherapy: Yes, "my mom put me in counseling after srinivas, I don't remember much of it."  History of Violence: no  History of sexual/physical abuse: No,    PAST MEDICAL & SURGICAL HISTORY   Past Medical History:   Diagnosis Date    Otitis      No past surgical history on file.        Home Meds:   Prior to Admission medications    Medication Sig Start Date End Date Taking? Authorizing Provider   ibuprofen (ADVIL,MOTRIN) 600 MG tablet Take 1 tablet (600 mg total) by mouth every 6 (six) hours as needed. 2/15/19   Darren Corbin MD   ondansetron (ZOFRAN) 4 MG tablet Take 1 tablet (4 mg total) by mouth every 8 (eight) hours as needed for Nausea. 2/16/19   Monty Pantoja PA-C           Scheduled Meds:    folic acid  1 mg Oral Daily    multivitamin  1 tablet Oral Daily    nicotine  1 patch Transdermal Daily      PRN Meds: acetaminophen, aluminum-magnesium hydroxide-simethicone, docusate sodium, influenza, loperamide, magnesium hydroxide 400 mg/5 ml   Psychotherapeutics (From admission, onward)            None          ALLERGIES   Review of patient's allergies indicates:  No Known Allergies    NEUROLOGIC HISTORY  Seizures: No  Head trauma: No    SOCIAL " "HISTORY:  Developmental/Childhood:Achieved all developmental milestones timely  Education:graduated from [employer]  Employment Status/Finances:Unemployed, "door dash sometimes"  Relationship Status/Sexual Orientation: in a committed relationship with girlfriend  Children: 0  Housing Status: Home with parents in [place]   history:  NO  Access to Firearms: YES:    ;  Locked up? NO  Advent:Non-spiritual  Recreational activities:"music producing"    SUBSTANCE ABUSE HISTORY   Recreational Drugs: marijuana   Use of Alcohol: denied  Rehab History:no   Tobacco Use:no    LEGAL HISTORY:   Past charges/incarcerations: No   Pending charges:No     FAMILY PSYCHIATRIC HISTORY   Family History   Problem Relation Age of Onset    No Known Problems Mother     No Known Problems Father      Mother- "I forgot, there's something"      ROS  Review of Systems   Constitutional: Negative for chills and fever.   HENT: Negative for hearing loss.    Eyes: Negative for blurred vision and double vision.   Respiratory: Negative for shortness of breath.    Cardiovascular: Negative for chest pain and palpitations.   Gastrointestinal: Negative for constipation, diarrhea and nausea.   Genitourinary: Negative for dysuria.   Musculoskeletal: Negative for back pain and neck pain.   Skin: Negative for rash.   Neurological: Negative for dizziness and headaches.   Endo/Heme/Allergies: Negative for environmental allergies.         EXAMINATION    PHYSICAL EXAM  Reviewed note/exam by Dr. Collin Pacheco Jr., MD  01/06/22 1302    VITALS   Vitals:    01/07/22 0700   BP: 129/87   Pulse: 74   Resp: 20   Temp: 99.5 °F (37.5 °C)        Body mass index is 26.15 kg/m².        PAIN  0/10  Subjective report of pain matches objective signs and symptoms: Yes    LABORATORY DATA   Recent Results (from the past 72 hour(s))   Urinalysis, Reflex to Urine Culture Urine, Clean Catch    Collection Time: 01/06/22 10:18 AM    Specimen: Urine   Result " Value Ref Range    Specimen UA Urine, Clean Catch     Color, UA Yellow Yellow, Straw, Keily    Appearance, UA Clear Clear    pH, UA 6.0 5.0 - 8.0    Specific Gravity, UA >1.030 (A) 1.005 - 1.030    Protein, UA 1+ (A) Negative    Glucose, UA Negative Negative    Ketones, UA 1+ (A) Negative    Bilirubin (UA) Negative Negative    Occult Blood UA Negative Negative    Nitrite, UA Negative Negative    Urobilinogen, UA Negative <2.0 EU/dL    Leukocytes, UA Negative Negative   Urinalysis Microscopic    Collection Time: 01/06/22 10:18 AM   Result Value Ref Range    RBC, UA 3 0 - 4 /hpf    WBC, UA 2 0 - 5 /hpf    Bacteria None None-Occ /hpf    Hyaline Casts, UA None 0-1/lpf /lpf    Microscopic Comment SEE COMMENT    Drug screen panel, emergency    Collection Time: 01/06/22 10:19 AM   Result Value Ref Range    Benzodiazepines Negative Negative    Methadone metabolites Negative Negative    Cocaine (Metab.) Negative Negative    Opiate Scrn, Ur Negative Negative    Barbiturate Screen, Ur Negative Negative    Amphetamine Screen, Ur Negative Negative    THC Presumptive Positive (A) Negative    Phencyclidine Negative Negative    Creatinine, Urine >450.0 (H) 23.0 - 375.0 mg/dL    Toxicology Information SEE COMMENT    CBC auto differential    Collection Time: 01/06/22 10:39 AM   Result Value Ref Range    WBC 6.79 3.90 - 12.70 K/uL    RBC 4.84 4.60 - 6.20 M/uL    Hemoglobin 14.0 14.0 - 18.0 g/dL    Hematocrit 43.0 40.0 - 54.0 %    MCV 89 82 - 98 fL    MCH 28.9 27.0 - 31.0 pg    MCHC 32.6 32.0 - 36.0 g/dL    RDW 12.4 11.5 - 14.5 %    Platelets 400 150 - 450 K/uL    MPV 9.1 (L) 9.2 - 12.9 fL    Immature Granulocytes 0.3 0.0 - 0.5 %    Gran # (ANC) 4.5 1.8 - 7.7 K/uL    Immature Grans (Abs) 0.02 0.00 - 0.04 K/uL    Lymph # 1.7 1.0 - 4.8 K/uL    Mono # 0.5 0.3 - 1.0 K/uL    Eos # 0.0 0.0 - 0.5 K/uL    Baso # 0.02 0.00 - 0.20 K/uL    nRBC 0 0 /100 WBC    Gran % 66.4 38.0 - 73.0 %    Lymph % 24.9 18.0 - 48.0 %    Mono % 8.0 4.0 - 15.0 %     Eosinophil % 0.1 0.0 - 8.0 %    Basophil % 0.3 0.0 - 1.9 %    Differential Method Automated    Comprehensive metabolic panel    Collection Time: 01/06/22 10:39 AM   Result Value Ref Range    Sodium 141 136 - 145 mmol/L    Potassium 4.0 3.5 - 5.1 mmol/L    Chloride 106 95 - 110 mmol/L    CO2 27 23 - 29 mmol/L    Glucose 95 70 - 110 mg/dL    BUN 11 6 - 20 mg/dL    Creatinine 1.0 0.5 - 1.4 mg/dL    Calcium 9.8 8.7 - 10.5 mg/dL    Total Protein 7.9 6.0 - 8.4 g/dL    Albumin 4.8 3.5 - 5.2 g/dL    Total Bilirubin 1.5 (H) 0.1 - 1.0 mg/dL    Alkaline Phosphatase 54 (L) 55 - 135 U/L    AST 20 10 - 40 U/L    ALT 20 10 - 44 U/L    Anion Gap 8 8 - 16 mmol/L    eGFR if African American >60 >60 mL/min/1.73 m^2    eGFR if non African American >60 >60 mL/min/1.73 m^2   TSH    Collection Time: 01/06/22 10:39 AM   Result Value Ref Range    TSH 1.280 0.400 - 4.000 uIU/mL   Ethanol    Collection Time: 01/06/22 10:39 AM   Result Value Ref Range    Alcohol, Serum <10 <10 mg/dL   Acetaminophen level    Collection Time: 01/06/22 10:39 AM   Result Value Ref Range    Acetaminophen (Tylenol), Serum <3.0 (L) 10.0 - 20.0 ug/mL   POCT COVID-19 Rapid Screening    Collection Time: 01/06/22 11:03 AM   Result Value Ref Range    POC Rapid COVID Negative Negative     Acceptable Yes    Hemoglobin A1C    Collection Time: 01/07/22  6:55 AM   Result Value Ref Range    Hemoglobin A1C 5.7 (H) 4.0 - 5.6 %    Estimated Avg Glucose 117 68 - 131 mg/dL   Lipid Panel    Collection Time: 01/07/22  6:55 AM   Result Value Ref Range    Cholesterol 123 120 - 199 mg/dL    Triglycerides 84 30 - 150 mg/dL    HDL 44 40 - 75 mg/dL    LDL Cholesterol 62.2 (L) 63.0 - 159.0 mg/dL    HDL/Cholesterol Ratio 35.8 20.0 - 50.0 %    Total Cholesterol/HDL Ratio 2.8 2.0 - 5.0    Non-HDL Cholesterol 79 mg/dL      No results found for: PHENYTOIN, PHENOBARB, VALPROATE, CBMZ        CONSTITUTIONAL  General Appearance: unremarkable, age appropriate    MUSCULOSKELETAL  Muscle  Strength and Tone:no tremor, no tic  Abnormal Involuntary Movements: No  Gait and Station: non-ataxic    PSYCHIATRIC   Level of Consciousness: awake and alert   Orientation: person, place and situation  Grooming: Casually dressed and Well groomed  Psychomotor Behavior: normal, cooperative  Speech: normal tone, normal rate, normal pitch, normal volume  Language: grossly intact  Mood: depressed  Affect: Consistent with mood  Thought Process: linear, logical  Associations: intact   Thought Content: +SI, no HI and no delusions  Perceptions: hallucinations:  auditory  Memory: Able to recall past events, Remote intact and Recent intact  Attention:Attends to interview without distraction  Fund of Knowledge: Aware of current events and Vocabulary appropriate   Estimate if Intelligence:  Average based on work/education history, vocabulary and mental status exam  Insight: has awareness of illness  Judgment: behavior is adequate to circumstances      PSYCHOSOCIAL    PSYCHOSOCIAL STRESSORS   drug and alcohol    FUNCTIONING RELATIONSHIPS   good support system    STRENGTHS AND LIABILITIES   Strength: Patient accepts guidance/feedback, Strength: Patient is expressive/articulate., Liability: Patient is impulsive., Liability: Patient lacks coping skills.    Is the patient aware of the biomedical complications associated with substance abuse and mental illness? yes    Does the patient have an Advance Directive for Mental Health treatment? no  (If yes, inform patient to bring copy.)        MEDICAL DECISION MAKING        ASSESSMENT       MDD, recurrent, severe with psychotic features  SI  TAMAR  Psychosocial stressors  Cannabis abuse        PROBLEM LIST AND MANAGEMENT PLANS    MDD, recurrent, severe with psychotic features  - start lexapro 5 mg PO qd, titrate as tolerated  - pt counseled  - follow up with outpatient mental health provider after discharge    Suicidal ideations  - continue psychiatric hospitalization  - provide  psychotherapeutic interventions and medication management      TAMAR  - start lexapro 5 mg PO qd, titrate as tolerated  - pt counseled  - follow up with outpatient mental health provider after discharge    Psychosocial stressors  - SW consulted for assistance with additional resources   - pt counseled  - follow up with outpatient mental health provider after discharge      Cannabis abuse  - SW consulted for assistance with additional resources   - pt counseled  - follow up with outpatient mental health provider after discharge      PRESCRIPTION DRUG MANAGEMENT  Compliance: unknown  Side Effects: unknown  Regimen Adjustments: see above    Discussed diagnosis, risks and benefits of proposed treatment vs alternative treatments vs no treatment, potential side effects of these treatments and the inherent unpredictability of treatment. The patient expresses understanding of the above and displays the capacity to agree with this treatment given said understanding. Patient also agrees that, currently, the benefits outweigh the risks and would like to pursue/continue treatment at this time.      DIAGNOSTIC TESTING  Labs reviewed with patient; follow up pending labs    Disposition:  -Will attempt to obtain outside psychiatric records if available  -SW to assist with aftercare planning and collateral  -Once stable discharge home with outpatient follow up care and/or rehab  -Continue inpatient treatment under a PEC and/or CEC for danger to self/ danger to others/grave disability as evident by danger to self        Raad Burns III, MD  Psychiatry

## 2022-01-07 NOTE — CONSULTS
"[facility]  Psychiatry  Telepsychiatry Consult Note    Patient agreeable to consultation via telepsychiatry.    Tele-Consultation from Psychiatry started: 1/6/2022 at 6:31p  The chief complaint leading to psychiatric consultation is:   This consultation was requested by the Emergency Department attending physician.  The location of the consulting psychiatrist is [place], [place]  The patient location is  [facility] EMERGENCY DEPARTMENT   The patient arrived at the ED at: approx 10a  Also present with the patient at the time of the consultation: nurse    Patient Identification:   Casey Sen III is a 21 y.o. male.    Patient information was obtained from the patient  Patient presented voluntarily to the ED    Consults  Subjective:     History of Present Illness:  ED consult "This is a 21 y.o. M who has no PMHx who presents to the ED for psychiatric evaluation. Pt currently complains of a headache. He is unable to describe the headache. He admits to CP. Pt states that he is always depressed. He reports a Hx of suicidal ideation without a plan and no attempt. There are no weapons in his home. He is not on psychiatric medications, nor does he have a psychiatric history, or Hx of psychiatric hospitalization. He reports marijuana use. Pt denies hallucinations, rash, swelling, homicidal ideation, tobacco use, or alcohol use."    Pt was provided with PO ativan for panic symptoms prior to my eval.    On interview (cut short as emergency transport arrived to collect pt to accepted inpatient unit)-  "For a long time I haven't been right, depressed and stuff. Someone said once I have two personalities."    "I have a spot in my head that triggers off- it hurts bad."   "I have felt depressed for a long time. I've never been suicidal, but I've been angry for a long time." No HI  Says he is bothered by his own voice in his head, thoughts that tell him things-- he becomes extremely tearful at this time and terminates " "interview.    Psychiatric History:   Previous Psychiatric Hospitalizations: never  Hx of childhood counseling +  Previous Medication Trials: none  Previous Suicide Attempts: denies  History of Violence: denies  History of Depression: yes, never treated  History of Bibi: none  History of Auditory/Visual Hallucination: possible  History of Delusions: none endorsed  Outpatient psychiatrist (current & past): none    Substance Abuse History:  Tobacco: never  Alcohol: never  Illicit Substances: smokes weed twice daily  Detox/Rehab:     Legal History: Past charges/incarcerations: unk    Family Psychiatric History:   unk    Social History:  Developmental/Childhood: From [place], grew up in [place], after Tonya went to [place]  *Education: graduated [employer]  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  Relationship Status/Sexual Orientation: has girlfriend  Children: none  Housing Status: lives with parents   history: never  Access to gun: YES  Anglican: none, "I feel out of place when I go into [facility], I start feeling weird when I go into a [facility]"  Recreation/Hobbies: 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    Psychiatric Mental Status Exam:  Arousal: alert  Sensorium/Orientation: oriented to grossly intact, person, place, situation  Behavior/Cooperation: cooperative   Speech: normal tone, soft  Language: grossly intact  Mood: "Cece been depressed a long time"   Affect: depressed and very tearful  Thought Process: blocked, concrete  Thought Content: curious about problem with his head  Auditory hallucinations: says it's his own thoughts- also appears distracted at times, specifically when asked about thoughts  Visual hallucinations: none  Paranoia: possible  Delusions:  possible  Suicidal ideation: denies  Homicidal ideation: denies  Attention/Concentration: impaired, possible thought blocking  Memory:    Recent:  intact   Remote: intact  Fund of Knowledge:  " Appropriate for education  Abstract reasoning: concrete  Insight: unable to gauge  Judgment: behavior is adequate to circumstances      Past Medical History:   Past Medical History:   Diagnosis Date    Otitis       Laboratory Data:   Labs Reviewed   CBC W/ AUTO DIFFERENTIAL - Abnormal; Notable for the following components:       Result Value    MPV 9.1 (*)     All other components within normal limits   COMPREHENSIVE METABOLIC PANEL - Abnormal; Notable for the following components:    Total Bilirubin 1.5 (*)     Alkaline Phosphatase 54 (*)     All other components within normal limits   URINALYSIS, REFLEX TO URINE CULTURE - Abnormal; Notable for the following components:    Specific Gravity, UA >1.030 (*)     Protein, UA 1+ (*)     Ketones, UA 1+ (*)     All other components within normal limits    Narrative:     Specimen Source->Urine   DRUG SCREEN PANEL, URINE EMERGENCY - Abnormal; Notable for the following components:    THC Presumptive Positive (*)     Creatinine, Urine >450.0 (*)     All other components within normal limits    Narrative:     Specimen Source->Urine   ACETAMINOPHEN LEVEL - Abnormal; Notable for the following components:    Acetaminophen (Tylenol), Serum <3.0 (*)     All other components within normal limits   TSH   ALCOHOL,MEDICAL (ETHANOL)   URINALYSIS MICROSCOPIC    Narrative:     Specimen Source->Urine   SARS-COV-2 RDRP GENE       Neurological History:  Seizures: unk    Allergies:   Review of patient's allergies indicates:  No Known Allergies    Medications in ER:   Medications   LORazepam tablet 1 mg (1 mg Oral Given 1/6/22 1023)       Medications at home: none    No new subjective & objective note has been filed under this hospital service since the last note was generated.      Assessment - Diagnosis - Goals:     Assessment:  Depression, possibly with psychotic features  R/o cannabis-intoxication, or cannabis related mood/psychotic disorder    eval cut short due to transportation arrival,  need more collateral to provide better sense of diagnostic differential, defer to inpt team.    Plan:  1. Dispo/Legal Status: Cont PEC at this time as the pt is currently gravely disabled due to an acute psychiatric illness. Seek inpt bed for pt safety and stabilization when/if medically cleared by the ER team.  2. Scheduled Medications: Defer changes to primary inpt team. Defer any non-psych meds to the ER MD.  3. PRN Medications: Ativan prn non-redirectable agitation associated with breakthrough psychosis or nury if needed to help the pt more effectively interact with his environment.   4. Precautions/Nursing: standard  5. To-Do: Continue to observe pt's behavior while in the ER and will reassess the pt daily until placement is found.        Time with patient: 16 minutes      More than 50% of the time was spent counseling/coordinating care    Consulting clinician was informed of the encounter and consult note.    Consultation ended: 1/6/2022 at 7:01p    Vane Grossman MD   Psychiatry  [facility]

## 2022-01-07 NOTE — HPI
"Chief Complaint   Patient presents with    Psychiatric Evaluation       Pt reports being stress out over the past 2 months and having "anger outburst". Pt reports PTSD from past events. No auditory or visual hallucinations at this time. No SI or HI. Pt intermittent reports right sided numbness and h/a when he gets these "stressed triggers".       CC: Psychiatric evaluation     HPI: This is a 21 y.o. M who has no PMHx who presents to the ED for psychiatric evaluation. Pt currently complains of a headache. He is unable to describe the headache. He admits to CP. Pt states that he is always depressed. He reports a Hx of suicidal ideation without a plan and no attempt. There are no weapons in his home. He is not on psychiatric medications, nor does he have a psychiatric history, or Hx of psychiatric hospitalization. He reports marijuana use. Pt denies hallucinations, rash, swelling, homicidal ideation, tobacco use, or alcohol use.     The history is provided by the patient. No  was used.   "

## 2022-01-07 NOTE — PLAN OF CARE
SW spoke with mom about approval with picking up keys for patient's car. Mom stated that she's working out a plan to be here as opposed to tow truck damaging patient's vehicle.

## 2022-01-07 NOTE — PLAN OF CARE
"As per transferring facility, 20 y/o male reports being stressed out over the past 2 months and having "anger outburst". Pt reports PTSD from past events. Events unknown.  No auditory or visual hallucinations at this time. No SI or HI. Pt has intermittent reports right sided numbness and h/a when he gets these "stressed triggers.  No PMHx.   Pt currently complains of a headache. He is unable to describe the headache. Pt states that he is always depressed. He reports a Hx of suicidal ideation without a plan and no attempt.  He is not on psychiatric medications, nor does he have a psychiatric history, or Hx of psychiatric hospitalization. UDS positive for marijuana.  Patient accepted by Dr. ANKITA Castro at [facility].       Upon arrival to [facility], Patient is calm and cooperative.  Alert and oriented.  No complaints of pain or discomfort.  Patient able to answer questions but reluctant to talk.  Patient asked to shower and refused a snack.  Patient very teary eyed when talking to staff.  Will not discuss his problems.  Patient has receipt in wallet for a gun with picture of gun on phone.  Patient oriented to room and went to sleep.  No concerns.          "

## 2022-01-07 NOTE — PLAN OF CARE
1:1   Behavior: Patient calm and cooperative.       Intervention: Psychotherapy Session focused on safety/crisis plan. Steps 1-4 focused on knowing when to get help, coping skills, social support, and seeking help from professionals.       Response: Patient completed safety plan.       Plan: SW will continue to encourage patient to attend psychotherapy group.

## 2022-01-07 NOTE — PLAN OF CARE
"Behavioral Health Unit  Psychosocial History and Assessment  Progress Note      Patient Name: Casey Sen III YOB: 2000 SW: Kasie Pearce GERALDO Date: 1/7/2022    Chief Complaint: depression, anxiety     Consent:     Did the patient consent for an interview with the ? Yes    Did the patient consent for the  to contact family/friend/caregiver?   Yes  Lissette Sen (Mother)     Did the patient give consent for the  to inform family/friend/caregiver of his/her whereabouts or to discuss discharge planning? Yes    Source of Information: Face to face with patient, Chart review and Treatment team meeting/rounds    Is information obtained from interviews considered reliable?   yes    Reason for Admission:     There are no hospital problems to display for this patient.      History of Present Illness - (Patient Perception):   Patient states that he's been feeling depressed and doesn't know what all is going on. States it's hard to be around people.     History of Present Illness - (Perception of Others): Per Collin Pacheco Jr., MD    "Psychiatric Evaluation       Pt reports being stress out over the past 2 months and having "anger outburst". Pt reports PTSD from past events. No auditory or visual hallucinations at this time. No SI or HI. Pt intermittent reports right sided numbness and h/a when he gets these "stressed triggers".      CC: Psychiatric evaluation     HPI: This is a 21 y.o. M who has no PMHx who presents to the ED for psychiatric evaluation. Pt currently complains of a headache. He is unable to describe the headache. He admits to CP. Pt states that he is always depressed. He reports a Hx of suicidal ideation without a plan and no attempt. There are no weapons in his home. He is not on psychiatric medications, nor does he have a psychiatric history, or Hx of psychiatric hospitalization. He reports marijuana use. Pt denies hallucinations, rash, " "swelling, homicidal ideation, tobacco use, or alcohol use."      Present biopsychosocial functioning: Patient is able to complete ADL's. Currently feels that he's loosing weight due to depression.     Past biopsychosocial functioning: Patient states that he feels hopeless, depressed, has low energy, unable to sleep, hard time focusing, feels slowed down, guilty, and has a hard time falling asleep and staying asleep. Patient states that he's dealing with death of many younger friends.      Family and Marital/Relationship History:     Significant Other/Partner Relationships: In a relationship.     Family Relationships: Patient feels that father doesn't understand him (relationship strained).       Childhood History:     Where was patient raised? [place]     Who raised the patient? Parents       How does patient describe their childhood?       Who is patient's primary support person? Parents       Culture and Restorationism:     Restorationism: none  How strong of a role does Caodaism and spirituality play in patient's life? n/a    Advent or spiritual concerns regarding treatment: spiritual concerns / distress    History of Abuse:   History of Abuse: Denies      Outcome: n/a    Psychiatric and Medical History:     History of psychiatric illness or treatment: currently under psychiatric care    Medical history:   Past Medical History:   Diagnosis Date    Otitis        Substance Abuse History:     Alcohol - (Patient Perspective): none  Social History     Substance and Sexual Activity   Alcohol Use No       Alcohol - (Collateral Perspective): unknown     Drugs - (Patient Perspective): marijuana usage here and there   Social History     Substance and Sexual Activity   Drug Use No       Drugs - (Collateral Perspective): unknown     Additional Comments: none     Education:     Currently Enrolled? No  High School (9-12) or GED Graduated from [employer].     Special Education? No    Interested in Completing Education/GED: " No    Employment and Financial:     Currently employed? 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 Yes with help of parents.     Who manages finances/personal affairs? Self       Service:     ? no    Combat Service? No     Community Resources:     Describe present use of community resources: [place] [facility]     Identify previously used community resources   (Include previous mental health treatment - outpatient and inpatient):     Environmental:     Current living situation:Lives with family(parents)     Social Evaluation:     Patient Assets: General fund of knowledge, Physical health, Ability for insight, Communicable skills and Special hobby/interest     Patient Limitations: coping skills     High risk psychosocial issues that may impact discharge planning:   None at this time     Recommendations:     Anticipated discharge plan:   home    High risk issues requiring early treatment planning and immediate intervention: none at this time     Community resources needed for discharge planning:  aftercare treatment sources    Anticipated social work role(s) in treatment and discharge planning: SW will contact patient's mom as collateral contact.

## 2022-01-08 PROCEDURE — S4991 NICOTINE PATCH NONLEGEND: HCPCS | Performed by: INTERNAL MEDICINE

## 2022-01-08 PROCEDURE — 90833 PSYTX W PT W E/M 30 MIN: CPT | Mod: ,,, | Performed by: PSYCHIATRY & NEUROLOGY

## 2022-01-08 PROCEDURE — 99233 SBSQ HOSP IP/OBS HIGH 50: CPT | Mod: ,,, | Performed by: PSYCHIATRY & NEUROLOGY

## 2022-01-08 PROCEDURE — 25000003 PHARM REV CODE 250: Performed by: INTERNAL MEDICINE

## 2022-01-08 PROCEDURE — 90833 PR PSYCHOTHERAPY W/PATIENT W/E&M, 30 MIN (ADD ON): ICD-10-PCS | Mod: ,,, | Performed by: PSYCHIATRY & NEUROLOGY

## 2022-01-08 PROCEDURE — 11400000 HC PSYCH PRIVATE ROOM

## 2022-01-08 PROCEDURE — 99233 PR SUBSEQUENT HOSPITAL CARE,LEVL III: ICD-10-PCS | Mod: ,,, | Performed by: PSYCHIATRY & NEUROLOGY

## 2022-01-08 RX ORDER — ESCITALOPRAM OXALATE 10 MG/1
10 TABLET ORAL DAILY
Status: DISCONTINUED | OUTPATIENT
Start: 2022-01-09 | End: 2022-01-11 | Stop reason: HOSPADM

## 2022-01-08 RX ADMIN — FOLIC ACID 1 MG: 1 TABLET ORAL at 08:01

## 2022-01-08 RX ADMIN — NICOTINE 1 PATCH: 21 PATCH, EXTENDED RELEASE TRANSDERMAL at 09:01

## 2022-01-08 RX ADMIN — ESCITALOPRAM 5 MG: 5 TABLET, FILM COATED ORAL at 08:01

## 2022-01-08 RX ADMIN — THERA TABS 1 TABLET: TAB at 08:01

## 2022-01-08 NOTE — PLAN OF CARE
POC reviewed this shift and is on going.   Pt isolated in his room and only came out for vs. He refused snacks. Cooperate with staff. Slept good with no complains. Denies SI/HI, A/V hallucinations. Will continue to monitor for changes.

## 2022-01-08 NOTE — PLAN OF CARE
POC reviewed this shift and is on going. Patient is depressed, anxious, and pacing. . Denies Suicidal Ideation, Homicidal Ideation, Auditory Hallucinations, Visual Hallucinations, Tactile Hallucinations, Gustatory Hallucinations, and Delusions. Minimal interaction with peers. Isolates to self. Pt continues to be medication compliant and staff will continue to monitor pt closely while on the unit.

## 2022-01-08 NOTE — PROGRESS NOTES
PSYCHIATRY DAILY INPATIENT PROGRESS NOTE  SUBSEQUENT HOSPITAL VISIT    ENCOUNTER DATE: 1/8/2022  SITE: [facility]    DATE OF ADMISSION: 1/6/2022  9:30 PM  LENGTH OF STAY: 2 days      CHIEF COMPLAINT   Casey Sen III is a 21 y.o. male, seen during daily jackson rounds on the inpatient unit.  Casey Sen III presented with the chief complaint of depression      The patient was seen and examined. The chart was reviewed.     Reviewed notes from Rns and MD and labs from the last 24 hours.    The patient's case was discussed with the treatment team/care providers today including Rns    Staff reports no behavioral or management issues.     The patient has been compliant with treatment.      Subjective 01/08/2022       Today the patient reports that he is doing ok. States that he is feeling somewhat better, and attributes this to the medication he is receiving. States that he feels he is less anxious. Reports that he is still feeling depressed, but this is also somewhat improving. States his sleep was better last night. Has been eating, but his appetite is still decreased. Reports that he had an episode last night where he got up to urinate and his vision was slightly blurry. Discussed ensuring he is getting enough PO intake. Denies SI/HI/AVH today.       The patient denies any side effects to medications.          Psychiatric ROS (observed, reported, or endorsed/denied):  Depressed mood - endorses  Interest/pleasure/anhedonia: denies  Guilt/hopelessness/worthlessness - endorses  Changes in Sleep - endorses  Changes in Appetite - endorses  Changes in Concentration - endorses  Changes in Energy - endorses  PMA/R- endorses  Suicidal- active/passive ideations - denies  Homicidal ideations: active/passive ideations - denies    Hallucinations - denies  Delusions - denies  Disorganized behavior - denies  Disorganized speech - denies  Negative symptoms - denies    Elevated mood - denies  Decreased need for sleep -  denies  Grandiosity - denies  Racing thoughts - denies  Impulsivity - denies  Irritability- denies  Increased energy - denies  Distractibility - denies  Increase in goal-directed activity or PMA- denies    Symptoms of TAMAR - endorses  Symptoms of Panic Disorder- denies  Symptoms of PTSD - denies        Overall progress: Patient is showing no improvement on the Unit to date        Psychotherapy:  · Target symptoms: depression  · Why chosen therapy is appropriate versus another modality: relevant to diagnosis  · Outcome monitoring methods: self-report, observation  · Therapeutic intervention type: supportive psychotherapy  · Topics discussed/themes: building skills sets for symptom management  · The patient's response to the intervention is guarded. The patient's progress toward treatment goals is fair.   · Duration of intervention: 16 minutes.        Medical ROS  ROS      PAST MEDICAL HISTORY   Past Medical History:   Diagnosis Date    Otitis            PSYCHOTROPIC MEDICATIONS   Scheduled Meds:   EScitalopram oxalate  5 mg Oral Daily    folic acid  1 mg Oral Daily    multivitamin  1 tablet Oral Daily    nicotine  1 patch Transdermal Daily     Continuous Infusions:  PRN Meds:.acetaminophen, aluminum-magnesium hydroxide-simethicone, docusate sodium, influenza, loperamide, magnesium hydroxide 400 mg/5 ml        EXAMINATION    VITALS   Vitals:    01/07/22 0700 01/07/22 1300 01/07/22 1930 01/08/22 0800   BP: 129/87 129/87 121/88 (!) 137/90   BP Location: Left arm  Left arm    Patient Position: Sitting  Sitting    Pulse: 74 74 87 75   Resp: 20 20 18 18   Temp: 99.5 °F (37.5 °C) 99.5 °F (37.5 °C) 98.7 °F (37.1 °C) 98 °F (36.7 °C)   TempSrc: Oral  Oral    SpO2: 100%  99% 99%   Weight:       Height:           Body mass index is 26.15 kg/m².        CONSTITUTIONAL  General Appearance: unremarkable, age appropriate    MUSCULOSKELETAL  Muscle Strength and Tone:no tremor, no tic  Abnormal Involuntary Movements: No  Gait and  Station: non-ataxic    PSYCHIATRIC   Level of Consciousness: awake and alert   Orientation: person, place and situation  Grooming: Casually dressed and Well groomed  Psychomotor Behavior: normal, cooperative  Speech: normal tone, normal rate, normal pitch, normal volume  Language: grossly intact  Mood: dysphoric  Affect: Consistent with mood  Thought Process: linear, logical  Associations: intact   Thought Content: DENIES suicidal ideation, DENIES homicidal ideation and no delusions  Perceptions: denies hallucinations  Memory: Able to recall past events, Remote intact and Recent intact  Attention:Attends to interview without distraction  Fund of Knowledge: Aware of current events and Vocabulary appropriate   Estimate if Intelligence:  Average based on work/education history, vocabulary and mental status exam  Insight: has awareness of illness  Judgment: behavior is adequate to circumstances        DIAGNOSTIC TESTING   Laboratory Results  No results found for this or any previous visit (from the past 24 hour(s)).          MEDICAL DECISION MAKING      ASSESSMENT:   MDD, recurrent, severe with psychotic features  SI  TAMAR  Psychosocial stressors  Cannabis abuse        PROBLEM LIST AND MANAGEMENT PLANS    MDD, recurrent, severe with psychotic features  - Titrate lexapro to 10 mg PO qd, titrate as tolerated  - pt counseled  - follow up with outpatient mental health provider after discharge     Suicidal ideations  - continue psychiatric hospitalization  - provide psychotherapeutic interventions and medication management        TAMAR  - Titrate lexapro 10mg PO qd, titrate as tolerated  - pt counseled  - follow up with outpatient mental health provider after discharge     Psychosocial stressors  - SW consulted for assistance with additional resources   - pt counseled  - follow up with outpatient mental health provider after discharge        Cannabis abuse  - SW consulted for assistance with additional resources   - pt  counseled  - follow up with outpatient mental health provider after discharge                 Discussed diagnosis, risks and benefits of proposed treatment vs alternative treatments vs no treatment, potential side effects of these treatments and the inherent unpredictability of treatment. The patient expresses understanding of the above and displays the capacity to agree with this treatment given said understanding. Patient also agrees that, currently, the benefits outweigh the risks and would like to pursue/continue treatment at this time.      DISCHARGE PLANNING  Expected Disposition Plan: Home or Self Care      NEED FOR CONTINUED HOSPITALIZATION  Psychiatric illness continues to pose a potential threat to life or bodily function, of self or others, thereby requiring the need for continued inpatient psychiatric hospitalization: Yes, due to: danger to self and gravely disabled, as evidenced by:  Concerns with SI--Fading. and Ongoing concerns with grave disability with patient unable to perform basic feeding, hygiene and dressing activities without significant constant support.    Protective inpatient pyschiatric hospitalization required while a safe disposition plan is enacted: Yes    Patient stabilized and ready for discharge from inpatient psychiatric unit: No        STAFF:   Alfredo Carter MD  Psychiatry

## 2022-01-09 LAB — SARS-COV-2 RNA RESP QL NAA+PROBE: NOT DETECTED

## 2022-01-09 PROCEDURE — 99232 PR SUBSEQUENT HOSPITAL CARE,LEVL II: ICD-10-PCS | Mod: ,,, | Performed by: PSYCHIATRY & NEUROLOGY

## 2022-01-09 PROCEDURE — 90833 PR PSYCHOTHERAPY W/PATIENT W/E&M, 30 MIN (ADD ON): ICD-10-PCS | Mod: ,,, | Performed by: PSYCHIATRY & NEUROLOGY

## 2022-01-09 PROCEDURE — 90833 PSYTX W PT W E/M 30 MIN: CPT | Mod: ,,, | Performed by: PSYCHIATRY & NEUROLOGY

## 2022-01-09 PROCEDURE — 25000003 PHARM REV CODE 250: Performed by: INTERNAL MEDICINE

## 2022-01-09 PROCEDURE — 99232 SBSQ HOSP IP/OBS MODERATE 35: CPT | Mod: ,,, | Performed by: PSYCHIATRY & NEUROLOGY

## 2022-01-09 PROCEDURE — 11400000 HC PSYCH PRIVATE ROOM

## 2022-01-09 PROCEDURE — U0002 COVID-19 LAB TEST NON-CDC: HCPCS | Performed by: INTERNAL MEDICINE

## 2022-01-09 PROCEDURE — 25000003 PHARM REV CODE 250: Performed by: PSYCHIATRY & NEUROLOGY

## 2022-01-09 RX ADMIN — THERA TABS 1 TABLET: TAB at 08:01

## 2022-01-09 RX ADMIN — ESCITALOPRAM OXALATE 10 MG: 10 TABLET ORAL at 08:01

## 2022-01-09 RX ADMIN — FOLIC ACID 1 MG: 1 TABLET ORAL at 08:01

## 2022-01-09 NOTE — PLAN OF CARE
POC reviewed this shift and is ongoing.  Pt in dinning room playing checkers and interacting with peers. Pt takes no snack. Cooperative with staff. Denies SI/HI. Interacted  and played checkers with peers. Will continue to monitor for changes and safety.

## 2022-01-09 NOTE — PROGRESS NOTES
"PSYCHIATRY DAILY INPATIENT PROGRESS NOTE  SUBSEQUENT HOSPITAL VISIT    ENCOUNTER DATE: 1/9/2022  SITE: [facility]    DATE OF ADMISSION: 1/6/2022  9:30 PM  LENGTH OF STAY: 3 days      CHIEF COMPLAINT   Casey Sen III is a 21 y.o. male, seen during daily jackson rounds on the inpatient unit.  Casey Sen III presented with the chief complaint of depression      The patient was seen and examined. The chart was reviewed.     Reviewed notes from Rns and MD and labs from the last 24 hours.    The patient's case was discussed with the treatment team/care providers today including Rns    Staff reports no behavioral or management issues.     The patient has been compliant with treatment.      Subjective 01/09/2022       Today the patient reports he is feeling better. States he tolerated the increased dose of lexapro with no notable side effects. Pt reports he increased his PO intake yesterday. Did not have any dizzy spells overnight. Describes his mood today as "pretty good." States that this is an improvement. Reports that he had some trouble sleeping last night and attributes this to being in the hospital. States that he has a spot on his head that has been there since the fourth grade. It is unchanged, but he is still concerned about it and would like this checked out. Denies SI/HI/AVH this morning. Denies other physical complaints. Denies feeling anxious at this time or throughout the past 24 hours.       The patient denies any side effects to medications.          Psychiatric ROS (observed, reported, or endorsed/denied):  Depressed mood - endorses  Interest/pleasure/anhedonia: denies  Guilt/hopelessness/worthlessness - endorses  Changes in Sleep - endorses  Changes in Appetite - endorses  Changes in Concentration - endorses  Changes in Energy - endorses  PMA/R- endorses  Suicidal- active/passive ideations - denies  Homicidal ideations: active/passive ideations - denies    Hallucinations - denies  Delusions " - denies  Disorganized behavior - denies  Disorganized speech - denies  Negative symptoms - denies    Elevated mood - denies  Decreased need for sleep - denies  Grandiosity - denies  Racing thoughts - denies  Impulsivity - denies  Irritability- denies  Increased energy - denies  Distractibility - denies  Increase in goal-directed activity or PMA- denies    Symptoms of TAMAR - endorses  Symptoms of Panic Disorder- denies  Symptoms of PTSD - denies        Overall progress: Patient is showing no improvement on the Unit to date        Psychotherapy:  · Target symptoms: depression  · Why chosen therapy is appropriate versus another modality: relevant to diagnosis  · Outcome monitoring methods: self-report, observation  · Therapeutic intervention type: supportive psychotherapy  · Topics discussed/themes: building skills sets for symptom management  · The patient's response to the intervention is guarded. The patient's progress toward treatment goals is fair.   · Duration of intervention: 16 minutes.        Medical ROS  Review of Systems   Constitutional: Negative for chills and fever.   HENT: Negative for congestion, hearing loss, sore throat and tinnitus.    Eyes: Negative for blurred vision, double vision, photophobia and pain.   Respiratory: Negative for cough, hemoptysis, sputum production, shortness of breath and wheezing.    Cardiovascular: Negative for chest pain, palpitations and leg swelling.   Gastrointestinal: Negative for abdominal pain, blood in stool, constipation, diarrhea, nausea and vomiting.   Genitourinary: Negative for dysuria, frequency, hematuria and urgency.   Musculoskeletal: Negative for back pain, joint pain and myalgias.   Skin: Negative for rash.   Neurological: Negative for dizziness, tremors, seizures, weakness and headaches.   Psychiatric/Behavioral: Positive for depression. Negative for hallucinations and suicidal ideas. The patient is nervous/anxious and has insomnia.          PAST MEDICAL  HISTORY   Past Medical History:   Diagnosis Date    Otitis            PSYCHOTROPIC MEDICATIONS   Scheduled Meds:   EScitalopram oxalate  10 mg Oral Daily    folic acid  1 mg Oral Daily    multivitamin  1 tablet Oral Daily     Continuous Infusions:  PRN Meds:.acetaminophen, aluminum-magnesium hydroxide-simethicone, docusate sodium, influenza, loperamide, magnesium hydroxide 400 mg/5 ml        EXAMINATION    VITALS   Vitals:    01/07/22 1930 01/08/22 0800 01/08/22 1921 01/09/22 0718   BP: 121/88 (!) 137/90 139/81 113/79   BP Location: Left arm  Right arm Left arm   Patient Position: Sitting  Sitting Sitting   Pulse: 87 75 70 72   Resp: 18 18 16 20   Temp: 98.7 °F (37.1 °C) 98 °F (36.7 °C) 98.8 °F (37.1 °C) 100.1 °F (37.8 °C)   TempSrc: Oral  Oral Oral   SpO2: 99% 99% 100% 99%   Weight:       Height:           Body mass index is 26.15 kg/m².        CONSTITUTIONAL  General Appearance: unremarkable, age appropriate    MUSCULOSKELETAL  Muscle Strength and Tone:no tremor, no tic  Abnormal Involuntary Movements: No  Gait and Station: non-ataxic    PSYCHIATRIC   Level of Consciousness: awake and alert   Orientation: person, place and situation  Grooming: Casually dressed and Well groomed  Psychomotor Behavior: normal, cooperative  Speech: normal tone, normal rate, normal pitch, normal volume  Language: grossly intact  Mood: dysphoric  Affect: Consistent with mood  Thought Process: linear, logical  Associations: intact   Thought Content: DENIES suicidal ideation, DENIES homicidal ideation and no delusions  Perceptions: denies hallucinations  Memory: Able to recall past events, Remote intact and Recent intact  Attention:Attends to interview without distraction  Fund of Knowledge: Aware of current events and Vocabulary appropriate   Estimate if Intelligence:  Average based on work/education history, vocabulary and mental status exam  Insight: has awareness of illness  Judgment: behavior is adequate to  circumstances        DIAGNOSTIC TESTING   Laboratory Results  Recent Results (from the past 24 hour(s))   COVID-19 Routine Screening    Collection Time: 01/09/22  7:19 AM   Result Value Ref Range    SARS-CoV2 (COVID-19) Qualitative PCR Not Detected Not Detected             MEDICAL DECISION MAKING      ASSESSMENT:   MDD, recurrent, severe with psychotic features  SI  TAMAR  Psychosocial stressors  Cannabis abuse        PROBLEM LIST AND MANAGEMENT PLANS    MDD, recurrent, severe with psychotic features  - lexapro 10 mg PO qd, titrate as tolerated  - pt counseled  - follow up with outpatient mental health provider after discharge     Suicidal ideations  - continue psychiatric hospitalization  - provide psychotherapeutic interventions and medication management        TAMAR  - lexapro 10mg qd, titrate as tolerated  - pt counseled  - follow up with outpatient mental health provider after discharge     Psychosocial stressors  - SW consulted for assistance with additional resources   - pt counseled  - follow up with outpatient mental health provider after discharge        Cannabis abuse  - SW consulted for assistance with additional resources   - pt counseled  - follow up with outpatient mental health provider after discharge                 Discussed diagnosis, risks and benefits of proposed treatment vs alternative treatments vs no treatment, potential side effects of these treatments and the inherent unpredictability of treatment. The patient expresses understanding of the above and displays the capacity to agree with this treatment given said understanding. Patient also agrees that, currently, the benefits outweigh the risks and would like to pursue/continue treatment at this time.      DISCHARGE PLANNING  Expected Disposition Plan: Home or Self Care      NEED FOR CONTINUED HOSPITALIZATION  Psychiatric illness continues to pose a potential threat to life or bodily function, of self or others, thereby requiring the need for  continued inpatient psychiatric hospitalization: Yes, due to: danger to self and gravely disabled, as evidenced by:  Concerns with SI--Fading. and Ongoing concerns with grave disability with patient unable to perform basic feeding, hygiene and dressing activities without significant constant support.    Protective inpatient pyschiatric hospitalization required while a safe disposition plan is enacted: Yes    Patient stabilized and ready for discharge from inpatient psychiatric unit: No        STAFF:   Alfredo Carter MD  Psychiatry

## 2022-01-09 NOTE — PLAN OF CARE
POC reviewed this shift and is on going. Patient admits to feeling better,evasive during conversation when discussing the reason for this admission, Endorses depression  Denies h/s ideations. Pt continues to be medication compliant and staff will continue to monitor pt closely while on the unit. Care as per treatment plan.

## 2022-01-10 PROCEDURE — 90833 PSYTX W PT W E/M 30 MIN: CPT | Mod: ,,, | Performed by: PSYCHIATRY & NEUROLOGY

## 2022-01-10 PROCEDURE — 11400000 HC PSYCH PRIVATE ROOM

## 2022-01-10 PROCEDURE — 25000003 PHARM REV CODE 250: Performed by: INTERNAL MEDICINE

## 2022-01-10 PROCEDURE — 99232 SBSQ HOSP IP/OBS MODERATE 35: CPT | Mod: ,,, | Performed by: PSYCHIATRY & NEUROLOGY

## 2022-01-10 PROCEDURE — 99232 PR SUBSEQUENT HOSPITAL CARE,LEVL II: ICD-10-PCS | Mod: ,,, | Performed by: PSYCHIATRY & NEUROLOGY

## 2022-01-10 PROCEDURE — 25000003 PHARM REV CODE 250: Performed by: PSYCHIATRY & NEUROLOGY

## 2022-01-10 PROCEDURE — 90833 PR PSYCHOTHERAPY W/PATIENT W/E&M, 30 MIN (ADD ON): ICD-10-PCS | Mod: ,,, | Performed by: PSYCHIATRY & NEUROLOGY

## 2022-01-10 RX ORDER — HYDROXYZINE PAMOATE 50 MG/1
50 CAPSULE ORAL NIGHTLY PRN
Status: DISCONTINUED | OUTPATIENT
Start: 2022-01-10 | End: 2022-01-11 | Stop reason: HOSPADM

## 2022-01-10 RX ORDER — HYDROXYZINE PAMOATE 25 MG/1
25 CAPSULE ORAL EVERY 8 HOURS PRN
Status: DISCONTINUED | OUTPATIENT
Start: 2022-01-10 | End: 2022-01-10

## 2022-01-10 RX ADMIN — HYDROXYZINE PAMOATE 50 MG: 50 CAPSULE ORAL at 10:01

## 2022-01-10 RX ADMIN — THERA TABS 1 TABLET: TAB at 08:01

## 2022-01-10 RX ADMIN — FOLIC ACID 1 MG: 1 TABLET ORAL at 08:01

## 2022-01-10 RX ADMIN — ESCITALOPRAM OXALATE 10 MG: 10 TABLET ORAL at 08:01

## 2022-01-10 NOTE — PROGRESS NOTES
PSYCHIATRY DAILY INPATIENT PROGRESS NOTE  SUBSEQUENT HOSPITAL VISIT    ENCOUNTER DATE: 1/10/2022  SITE: [facility]    DATE OF ADMISSION: 1/6/2022  9:30 PM  LENGTH OF STAY: 4 days    CHIEF COMPLAINT   Casey Sen III is a 21 y.o. male, seen during daily jackson rounds on the inpatient unit.  Casey Sen III presented with the chief complaint of depression    The patient was seen and examined. The chart was reviewed.     Reviewed notes from Rns and MD and labs from the last 24 hours.    The patient's case was discussed with the treatment team/care providers today including Rns and MD    Staff reports no behavioral or management issues.     The patient has been compliant with treatment.    Subjective 01/10/2022    Patient reports improvement in depression and anxiety, denies any suicidal or homicidal ideation. Has been compliant with medications and will likely be a candidate for discharge tomorrow.     The patient denies any side effects to medications.    Psychiatric ROS (observed, reported, or endorsed/denied):  Depressed mood - denies  Interest/pleasure/anhedonia: denies  Guilt/hopelessness/worthlessness - denies  Changes in Sleep - denies  Changes in Appetite - denies  Changes in Concentration - denies  Changes in Energy - denies  PMA/R- denies  Suicidal- active/passive ideations - denies  Homicidal ideations: active/passive ideations - denies    Hallucinations - denies  Delusions - denies  Disorganized behavior - denies  Disorganized speech - denies  Negative symptoms - denies    Elevated mood - denies  Decreased need for sleep - denies  Grandiosity - denies  Racing thoughts - denies  Impulsivity - denies  Irritability- denies  Increased energy - denies  Distractibility - denies  Increase in goal-directed activity or PMA- denies    Symptoms of TAMAR - denies  Symptoms of Panic Disorder- denies  Symptoms of PTSD - denies    Overall progress: Patient is showing significant  improvement    Psychotherapy:  · Target symptoms: depression  · Why chosen therapy is appropriate versus another modality: relevant to diagnosis  · Outcome monitoring methods: self-report, observation  · Therapeutic intervention type: supportive psychotherapy  · Topics discussed/themes: building skills sets for symptom management, symptom recognition  · The patient's response to the intervention is accepting. The patient's progress toward treatment goals is fair.   · Duration of intervention: 16 minutes.    Medical ROS  Review of Systems   Constitutional: Negative.    HENT: Negative.    Eyes: Negative.    Respiratory: Negative.    Cardiovascular: Negative.    Gastrointestinal: Negative.    Genitourinary: Negative.    Musculoskeletal: Negative.    Skin: Negative.    Neurological: Negative.    Endo/Heme/Allergies: Negative.    Psychiatric/Behavioral: Negative.         As noted       PAST MEDICAL HISTORY   Past Medical History:   Diagnosis Date    Otitis        PSYCHOTROPIC MEDICATIONS   Scheduled Meds:   EScitalopram oxalate  10 mg Oral Daily    folic acid  1 mg Oral Daily    multivitamin  1 tablet Oral Daily     Continuous Infusions:  PRN Meds:.acetaminophen, aluminum-magnesium hydroxide-simethicone, docusate sodium, influenza, loperamide, magnesium hydroxide 400 mg/5 ml    EXAMINATION    VITALS   Vitals:    01/08/22 1921 01/09/22 0718 01/09/22 1918 01/10/22 0757   BP: 139/81 113/79 124/87 134/89   BP Location: Right arm Left arm  Left arm   Patient Position: Sitting Sitting  Sitting   Pulse: 70 72 77 86   Resp: 16 20 20 20   Temp: 98.8 °F (37.1 °C) 100.1 °F (37.8 °C) 99.3 °F (37.4 °C) 98.3 °F (36.8 °C)   TempSrc: Oral Oral  Oral   SpO2: 100% 99% 99% 100%   Weight:       Height:           Body mass index is 26.15 kg/m².    CONSTITUTIONAL  General Appearance: unremarkable, age appropriate    MUSCULOSKELETAL  Muscle Strength and Tone:no tremor, no tic  Abnormal Involuntary Movements: No  Gait and Station:  non-ataxic    PSYCHIATRIC   Level of Consciousness: awake and alert   Orientation: person, place and situation  Grooming: Casually dressed and Well groomed  Psychomotor Behavior: normal, cooperative  Speech: normal tone, normal rate, normal pitch, normal volume  Language: grossly intact  Mood: fine  Affect: Consistent with mood  Thought Process: linear, logical  Associations: intact   Thought Content: DENIES suicidal ideation, DENIES homicidal ideation and no delusions  Perceptions: denies hallucinations  Memory: Able to recall past events, Remote intact and Recent intact  Attention:Attends to interview without distraction  Fund of Knowledge: Aware of current events and Vocabulary appropriate   Estimate if Intelligence:  Average based on work/education history, vocabulary and mental status exam  Insight: has awareness of illness  Judgment: behavior is adequate to circumstances    DIAGNOSTIC TESTING   Laboratory Results  No results found for this or any previous visit (from the past 24 hour(s)).    MEDICAL DECISION MAKING   ASSESSMENT:   MDD, recurrent, severe with psychotic features  SI  TAMAR  Psychosocial stressors  Cannabis abuse     PROBLEM LIST AND MANAGEMENT PLANS     MDD, recurrent, severe with psychotic features  - lexapro 10 mg PO qd, titrate as tolerated  - pt counseled  - follow up with outpatient mental health provider after discharge     Suicidal ideations  - continue psychiatric hospitalization  - provide psychotherapeutic interventions and medication management     TAMAR  - lexapro 10mg qd, titrate as tolerated  - pt counseled  - follow up with outpatient mental health provider after discharge     Psychosocial stressors  - SW consulted for assistance with additional resources   - pt counseled  - follow up with outpatient mental health provider after discharge      Cannabis abuse  - SW consulted for assistance with additional resources   - pt counseled  - follow up with outpatient mental health provider  after discharge    Discussed diagnosis, risks and benefits of proposed treatment vs alternative treatments vs no treatment, potential side effects of these treatments and the inherent unpredictability of treatment. The patient expresses understanding of the above and displays the capacity to agree with this treatment given said understanding. Patient also agrees that, currently, the benefits outweigh the risks and would like to pursue/continue treatment at this time.        DISCHARGE PLANNING  Expected Disposition Plan: Home or Self Care        NEED FOR CONTINUED HOSPITALIZATION  Psychiatric illness continues to pose a potential threat to life or bodily function, of self or others, thereby requiring the need for continued inpatient psychiatric hospitalization: Yes, due to: danger to self and gravely disabled, as evidenced by:  Concerns with SI--Fading. and Ongoing concerns with grave disability with patient unable to perform basic feeding, hygiene and dressing activities without significant constant support.     Protective inpatient pyschiatric hospitalization required while a safe disposition plan is enacted: Yes     Patient stabilized and ready for discharge from inpatient psychiatric unit: No    STAFF:   Mateus Ya NP  Psychiatry

## 2022-01-10 NOTE — PLAN OF CARE
Collateral: Lissette Sen [phone]    Called in reference to patient's gun being placed in safe place.   Left voicemail to call back.

## 2022-01-10 NOTE — PLAN OF CARE
Behavior: Patient appropriate and attentive.     Intervention: Cognitive behavorial therapy focused on challenging negative thoughts. Patient's viewed how depression, poor self-esteem, and anxiety are often result of irrational thoughts.     Response: Patient stated that he no longer feels that same way prior to admit. Patient very vocal regarding negative thoughts and living environment that affects his daily living. States that living in [place] is difficult due to loosing family members at a young age (states his reasoning of being a concealed carry). Irrational thoughts were life being too difficult to manage.      Plan: SW will continue to encourage patient to attend psychotherapy group.

## 2022-01-10 NOTE — PLAN OF CARE
"Patient requested to speak with SW regarding him feeling as if he doesn't belong in the hospital due to only going into ER for headaches. Patient states that he was never suicidal and doesn't understand why he was admitted to the hospital.     2nd request, patient stated that this information shouldn't be on his record, due to him feeling that information is untrue. Patient spoke about he and his mom getting a . Patient plans to call patient advocacy line for help. Patient stated "I'm not crazy, I don't belong here."  "

## 2022-01-10 NOTE — PLAN OF CARE
"Patient is alert and oriented, calm, cooperative, and focused on discharge. Patient states " I really don't need to be here". Accepting meals with good appetite and medication compliant without side effects noted. Patient endorses that he feels depression has improved since he is here on current medication Lexapro. Patient interacts with staff and peers ad karli. Attended all group sessions offered today. Mateus Ya NP visited per telemed with no new orders today.  Patient is in the dining room eating supper meal at this time. Close observations continued and safe environment maintained.  "

## 2022-01-10 NOTE — PLAN OF CARE
POC reviewed this shift and is on going.  Pt cooperative with staff and received no med this p.m. Pt slept well with minimal interruptions. Pt denies SI/HI. Out on the unit interacting with peers. Will continue to monitor for changes and safety.

## 2022-01-11 VITALS
OXYGEN SATURATION: 99 % | WEIGHT: 171.94 LBS | SYSTOLIC BLOOD PRESSURE: 145 MMHG | HEIGHT: 68 IN | RESPIRATION RATE: 18 BRPM | DIASTOLIC BLOOD PRESSURE: 86 MMHG | HEART RATE: 84 BPM | TEMPERATURE: 99 F | BODY MASS INDEX: 26.06 KG/M2

## 2022-01-11 PROCEDURE — 25000003 PHARM REV CODE 250: Performed by: INTERNAL MEDICINE

## 2022-01-11 PROCEDURE — 90833 PSYTX W PT W E/M 30 MIN: CPT | Mod: ,,, | Performed by: PSYCHIATRY & NEUROLOGY

## 2022-01-11 PROCEDURE — 90833 PR PSYCHOTHERAPY W/PATIENT W/E&M, 30 MIN (ADD ON): ICD-10-PCS | Mod: ,,, | Performed by: PSYCHIATRY & NEUROLOGY

## 2022-01-11 PROCEDURE — 99239 PR HOSPITAL DISCHARGE DAY,>30 MIN: ICD-10-PCS | Mod: ,,, | Performed by: PSYCHIATRY & NEUROLOGY

## 2022-01-11 PROCEDURE — 25000003 PHARM REV CODE 250: Performed by: PSYCHIATRY & NEUROLOGY

## 2022-01-11 PROCEDURE — 99239 HOSP IP/OBS DSCHRG MGMT >30: CPT | Mod: ,,, | Performed by: PSYCHIATRY & NEUROLOGY

## 2022-01-11 RX ORDER — ESCITALOPRAM OXALATE 10 MG/1
10 TABLET ORAL DAILY
Qty: 30 TABLET | Refills: 2 | Status: SHIPPED | OUTPATIENT
Start: 2022-01-12 | End: 2024-04-01

## 2022-01-11 RX ADMIN — FOLIC ACID 1 MG: 1 TABLET ORAL at 08:01

## 2022-01-11 RX ADMIN — THERA TABS 1 TABLET: TAB at 08:01

## 2022-01-11 RX ADMIN — ESCITALOPRAM OXALATE 10 MG: 10 TABLET ORAL at 08:01

## 2022-01-11 NOTE — PROGRESS NOTES
.PSYCHOTHERAPY ADD-ON [id number]   16-37 minutes      Time: 16 minutes  Participants: Met with patient    Therapeutic Intervention Type: insight oriented psychotherapy, behavior modifying psychotherapy, supportive psychotherapy, interactive psychotherapy  Why chosen therapy is appropriate versus another modality: relevant to diagnosis, patient responds to this modality, evidence based practice    Target symptoms: depression, anxiety   Primary focus: depression  Psychotherapeutic techniques: supportive and psycho-educational techniques    Outcome monitoring methods: self-report, observation    Patient's response to intervention:  The patient's response to intervention is accepting.    Progress toward goals:  The patient's progress toward goals is good.    Deniz Castro MD

## 2022-01-11 NOTE — PLAN OF CARE
Patient is alert and oriented, pleasant, and cooperative. Patient took shower this am. Dr. Castro visited per telemed with order for discharge. Denies S/HI, A/VH. No delusions. No negative behaviors. Patient is glad to be going home. Patient is expressing thanks to staff and endorsing that he his feeling better and goal directed to maintain mental health appointments. Discharge paperwork given with instructions . Reviewed with patient pharmacy location,medications, and aftercare mental health appointment. Patient verbalized understanding. Patient left unit ambulatory with personal belongings accompanied by staff member downstairs to meet girlfriend. Left per car no distress noted.

## 2022-01-11 NOTE — DISCHARGE SUMMARY
"Discharge Summary  Psychiatry    Admit Date: 1/6/2022    Discharge Date and Time:  01/11/2022 9:14 AM    Attending Physician: Deniz Castro MD     Discharge Provider: Deniz Castro MD    Reason for Admission:  : depression and thoughts of death/suicide    History of Present Illness:   The patient presented to the ER on 1/6/2022 with complaints of depression     The patient was medically cleared and admitted to the [facility].     Per ED MD:  Pt reports being stress out over the past 2 months and having "anger outburst". Pt reports PTSD from past events. No auditory or visual hallucinations at this time. No SI or HI. Pt intermittent reports right sided numbness and h/a when he gets these "stressed triggers".       CC: Psychiatric evaluation     HPI: This is a 21 y.o. M who has no PMHx who presents to the ED for psychiatric evaluation. Pt currently complains of a headache. He is unable to describe the headache. He admits to CP. Pt states that he is always depressed. He reports a Hx of suicidal ideation without a plan and no attempt. There are no weapons in his home. He is not on psychiatric medications, nor does he have a psychiatric history, or Hx of psychiatric hospitalization. He reports marijuana use. Pt denies hallucinations, rash, swelling, homicidal ideation, tobacco use, or alcohol use.     The history is provided by the patient. No  was used.      This is the emergent evaluation of a 21-year-old male presents emergency department for evaluation of feeling as though "my head hurts ".  By this, he means that he feels that he is in mental health crisis.  Differential diagnosis at the time of initial evaluation included, but was not limited to:  Primary psychiatric disorder with acute decompensation, metabolic disturbance, withdrawal syndrome, intoxication.  Patient has active suicidal ideation.  He also appears to possibly be experiencing auditory hallucinations.  This may be new " "onset psychosis.  Given the above, I did give the patient some Ativan as he stated he was anxious and appeared anxious.  He has been very cooperative.  He is medically cleared for psychiatric evaluation at this time.  There is no evidence of infection, metabolic derangement, or intoxication.  His drug screen is positive for THC, which he admits to smoking regularly.           Per psychiatric consult MD:  ED consult "This is a 21 y.o. M who has no PMHx who presents to the ED for psychiatric evaluation. Pt currently complains of a headache. He is unable to describe the headache. He admits to CP. Pt states that he is always depressed. He reports a Hx of suicidal ideation without a plan and no attempt. There are no weapons in his home. He is not on psychiatric medications, nor does he have a psychiatric history, or Hx of psychiatric hospitalization. He reports marijuana use. Pt denies hallucinations, rash, swelling, homicidal ideation, tobacco use, or alcohol use."     Pt was provided with PO ativan for panic symptoms prior to my eval.     On interview (cut short as emergency transport arrived to collect pt to accepted inpatient unit)-  "For a long time I haven't been right, depressed and stuff. Someone said once I have two personalities."     "I have a spot in my head that triggers off- it hurts bad."   "I have felt depressed for a long time. I've never been suicidal, but I've been angry for a long time." No HI  Says he is bothered by his own voice in his head, thoughts that tell him things-- he becomes extremely tearful at this time and terminates interview.        Per RN:  As per transferring facility, 20 y/o male reports being stressed out over the past 2 months and having "anger outburst". Pt reports PTSD from past events. Events unknown.  No auditory or visual hallucinations at this time. No SI or HI. Pt has intermittent reports right sided numbness and h/a when he gets these "stressed triggers.  No PMHx.   Pt " "currently complains of a headache. He is unable to describe the headache. Pt states that he is always depressed. He reports a Hx of suicidal ideation without a plan and no attempt.  He is not on psychiatric medications, nor does he have a psychiatric history, or Hx of psychiatric hospitalization. UDS positive for marijuana.  Patient accepted by Dr. ANKITA Castro at [facility].       Upon arrival to [facility], Patient is calm and cooperative.  Alert and oriented.  No complaints of pain or discomfort.  Patient able to answer questions but reluctant to talk.  Patient asked to shower and refused a snack.  Patient very teary eyed when talking to staff.  Will not discuss his problems.  Patient has receipt in wallet for a gun with picture of gun on phone.  Patient oriented to room and went to sleep.  No concerns.             Psychiatric interview:  "I just don't feel right," patient immediately tearful, crying, "it's just too much, I got a lot going on, I have a lot of problems." He states "I been overthinking, I feel like I have two personalities, I'm stuck in my thoughts talking to myself.... it's hard for me to be around people."        Symptoms of Depression: diminished mood - Yes, loss of interest/anhedonia - Yes;  recurrent - Yes, >14 days - Yes, diminished energy - Yes, change in sleep - Yes, change in appetite - Yes, diminished concentration or cognition or indecisiveness - Yes, PMA/R -  Yes, excessive guilt or hopelessness or worthlessness - Yes, suicidal ideations - Yes     Changes in Sleep: trouble with initiation- Yes, maintenance, - Yes early morning awakening with inability to return to sleep - No, hypersomnolence - No     Suicidal- active/passive ideations - Yes, organized plans, future intentions - No     Homicidal ideations: active/passive ideations - No, organized plans, future intentions - No     Symptoms of psychosis: hallucinations - Yes, delusions - No, disorganized speech - No, disorganized behavior or " "abnormal motor behavior - No, or negative symptoms (diminshed emotional expression, avolition, anhedonia, alogia, asociality) - No, active phase symptoms >1 month - No, continuous signs of illness > 6 months - No, since onset of illness decreased level of functioning present - No     Symptoms of nury or hypomania: elevated, expansive, or irritable mood with increased energy or activity - No; > 4 days - No,  >7 days - No; with inflated self-esteem or grandiosity - No, decreased need for sleep - No, increased rate of speech - No, FOI or racing thoughts - No, distractibility - No, increased goal directed activity or PMA - No, risky/disinhibited behavior - No     Symptoms of ATMAR: excessive anxiety/worry/fear, more days than not, about numerous issues - Yes, ongoing for >6 months - Yes, difficult to control - Yes, with restlessness - Yes, fatigue - Yes, poor concentration - Yes, irritability - Yes, muscle tension - Yes, sleep disturbance - Yes; causes functionally impairing distress - Yes     Symptoms of Panic Disorder: recurrent panic attacks (palpitations/heart racing, sweating, shakiness, dyspnea, choking, chest pain/discomfort, Gi symptoms, dizzy/lightheadedness, hot/col flashes, paresthesias, derealization, fear of losing control or fear of dying or fear of "going crazy") - No, precipitated - No, un-precipitated - No, source of worry and/or behavioral changes secondary for 1 month or longer- No, agoraphobia - No     Symptoms of PTSD: h/o trauma exposure - Yes; re-experiencing/intrusive symptoms - No      Symptoms of OCD: obsessions (recurrent thoughts/urges/images; intrusive and/or unwanted; uses other thoughts/actions to suppress) - No; compulsions (repetitive behaviors used to lower distress/anxiety/obsessions) - No, time-consuming (over 1 hour per day) or cause significant distress/impairment - - No     Symptoms of Anorexia: restriction of caloric intake leading to significantly low body weight - No, intense " fear of gaining weight or persistent behavior that interferes with weight gain even thought at a significantly low weight - No, disturbance in the way in which one's body weight or shape is experienced, undue influence of body weight or shape on self evaluation, or persistent lack of recognition of the seriousness of the current low body weight - No     Symptoms of Bulimia: recurrent episodes of binge eating (definitely larger amount  than what others would eat and lack of a sense of control over eating during episode) - No, recurrent inappropriate compensatory behaviors in order to prevent weight gain (fasting, medications, exercise, vomiting) - No, binges and compensatory behaviors both occur on average at least once a week for 3 months - No, self evaluations is unduly influenced by body shape/weight- - No            Procedures Performed: * No surgery found *    Hospital Course:    Patient was admitted to the inpatient psychiatry unit after being medically cleared in the ED. Chart and labs were reviewed. The patient was stabilized as follows:      MDD, recurrent, severe with psychotic features  - lexapro 10 mg PO q DAY  - pt counseled  - follow up with outpatient mental health provider after discharge     Suicidal ideations  - continue psychiatric hospitalization  - provide psychotherapeutic interventions and medication management     TAMAR  - lexapro 10mg qd, titrate as tolerated  - pt counseled  - follow up with outpatient mental health provider after discharge     Psychosocial stressors  - SW consulted for assistance with additional resources   - pt counseled  - follow up with outpatient mental health provider after discharge      Cannabis abuse  - SW consulted for assistance with additional resources   - pt counseled  - follow up with outpatient mental health provider after discharge           During hospitalization, the patient was encouraged to go to both groups and individual counseling. Patient was monitored  "for any side effects. A meeting was held with multidisciplinary team prior to discharge and pt's diagnosis, current medications, and follow up were discussed. The patient has been compliant with treatment and can adequately attend to activities of daily living in an independent manner. The patient denies any side effects. The patient denies SI, HI, plan or intent for self harm or harm to others. The patient is no longer a danger to self or others nor gravely disabled disabled. Patient discharged  in stable condition with scheduled outpatient follow up.    The patient reports improved symptoms as documented below. He is currently stable for discharge home and is able/willing to attend outpatient care. He has good social support. He can identify positive coping skills. He cites his family as the primary reason he would never hurt himself, "I would never do that to them." He is hopeful, future oriented and goal directed.      Depressed mood - denies  Interest/pleasure/anhedonia: denies  Guilt/hopelessness/worthlessness - denies  Changes in Sleep - denies  Changes in Appetite - denies  Changes in Concentration - denies  Changes in Energy - denies  PMA/R- denies  Suicidal- active/passive ideations - denies  Homicidal ideations: active/passive ideations - denies     Hallucinations - denies  Delusions - denies  Disorganized behavior - denies  Disorganized speech - denies  Negative symptoms - denies     Elevated mood - denies  Decreased need for sleep - denies  Grandiosity - denies  Racing thoughts - denies  Impulsivity - denies  Irritability- denies  Increased energy - denies  Distractibility - denies  Increase in goal-directed activity or PMA- denies     Symptoms of TAMAR - denies  Symptoms of Panic Disorder- denies  Symptoms of PTSD - denies    Discussed diagnosis, risks and benefits of proposed treatment vs alternative treatments vs no treatment, and potential side effects of these treatments.  The patient expresses " understanding of the above and displays the capacity to agree with this treatment given said understanding.  Patient also agrees that, currently, the benefits outweigh the risks and would like to pursue treatment at this time.      Discharge MSE: stated age, casually dressed, well groomed.  No psychomotor agitation or retardation.  No abnormal involuntary movements.  Gait normal.  Speech normal, conversational.  Language fluent English. Mood fine.  Affect normal range, pleasant, euthymic.  Thought process linear.  Associations intact.  Denies suicidal or homicidal ideation.  Denies auditory hallucinations, paranoid ideation, ideas of reference.  Memory intact.  Attention intact.  Fund of knowledge intact.  Insight intact.  Judgment intact.  Alert and oriented to person, place, time.      Tobacco Usage:  Is patient a smoker? No  Does patient want prescription for Tobacco Cessation? No  Does patient want counseling for Tobacco Cessation? No    If patient would like to quit, then over the counter nicotine patch could be used. The patient could also follow up with his PCP or psychiatric provider for other alternatives.     Final Diagnoses:    Principal Problem: MDD, recurrent, severe without  psychotic features   Secondary Diagnoses:   TAMAR  Psychosocial stressors  Cannabis abuse    Labs:  Admission on 01/06/2022   Component Date Value Ref Range Status    Hemoglobin A1C 01/07/2022 5.7* 4.0 - 5.6 % Final    Estimated Avg Glucose 01/07/2022 117  68 - 131 mg/dL Final    Cholesterol 01/07/2022 123  120 - 199 mg/dL Final    Triglycerides 01/07/2022 84  30 - 150 mg/dL Final    HDL 01/07/2022 44  40 - 75 mg/dL Final    LDL Cholesterol 01/07/2022 62.2* 63.0 - 159.0 mg/dL Final    HDL/Cholesterol Ratio 01/07/2022 35.8  20.0 - 50.0 % Final    Total Cholesterol/HDL Ratio 01/07/2022 2.8  2.0 - 5.0 Final    Non-HDL Cholesterol 01/07/2022 79  mg/dL Final    SARS-CoV2 (COVID-19) Qualitative P* 01/09/2022 Not Detected  Not  Detected Final   Admission on 01/06/2022, Discharged on 01/06/2022   Component Date Value Ref Range Status    WBC 01/06/2022 6.79  3.90 - 12.70 K/uL Final    RBC 01/06/2022 4.84  4.60 - 6.20 M/uL Final    Hemoglobin 01/06/2022 14.0  14.0 - 18.0 g/dL Final    Hematocrit 01/06/2022 43.0  40.0 - 54.0 % Final    MCV 01/06/2022 89  82 - 98 fL Final    MCH 01/06/2022 28.9  27.0 - 31.0 pg Final    MCHC 01/06/2022 32.6  32.0 - 36.0 g/dL Final    RDW 01/06/2022 12.4  11.5 - 14.5 % Final    Platelets 01/06/2022 400  150 - 450 K/uL Final    MPV 01/06/2022 9.1* 9.2 - 12.9 fL Final    Immature Granulocytes 01/06/2022 0.3  0.0 - 0.5 % Final    Gran # (ANC) 01/06/2022 4.5  1.8 - 7.7 K/uL Final    Immature Grans (Abs) 01/06/2022 0.02  0.00 - 0.04 K/uL Final    Lymph # 01/06/2022 1.7  1.0 - 4.8 K/uL Final    Mono # 01/06/2022 0.5  0.3 - 1.0 K/uL Final    Eos # 01/06/2022 0.0  0.0 - 0.5 K/uL Final    Baso # 01/06/2022 0.02  0.00 - 0.20 K/uL Final    nRBC 01/06/2022 0  0 /100 WBC Final    Gran % 01/06/2022 66.4  38.0 - 73.0 % Final    Lymph % 01/06/2022 24.9  18.0 - 48.0 % Final    Mono % 01/06/2022 8.0  4.0 - 15.0 % Final    Eosinophil % 01/06/2022 0.1  0.0 - 8.0 % Final    Basophil % 01/06/2022 0.3  0.0 - 1.9 % Final    Differential Method 01/06/2022 Automated   Final    Sodium 01/06/2022 141  136 - 145 mmol/L Final    Potassium 01/06/2022 4.0  3.5 - 5.1 mmol/L Final    Chloride 01/06/2022 106  95 - 110 mmol/L Final    CO2 01/06/2022 27  23 - 29 mmol/L Final    Glucose 01/06/2022 95  70 - 110 mg/dL Final    BUN 01/06/2022 11  6 - 20 mg/dL Final    Creatinine 01/06/2022 1.0  0.5 - 1.4 mg/dL Final    Calcium 01/06/2022 9.8  8.7 - 10.5 mg/dL Final    Total Protein 01/06/2022 7.9  6.0 - 8.4 g/dL Final    Albumin 01/06/2022 4.8  3.5 - 5.2 g/dL Final    Total Bilirubin 01/06/2022 1.5* 0.1 - 1.0 mg/dL Final    Alkaline Phosphatase 01/06/2022 54* 55 - 135 U/L Final    AST 01/06/2022 20  10 - 40 U/L Final     ALT 01/06/2022 20  10 - 44 U/L Final    Anion Gap 01/06/2022 8  8 - 16 mmol/L Final    eGFR if African American 01/06/2022 >60  >60 mL/min/1.73 m^2 Final    eGFR if non African American 01/06/2022 >60  >60 mL/min/1.73 m^2 Final    TSH 01/06/2022 1.280  0.400 - 4.000 uIU/mL Final    Specimen UA 01/06/2022 Urine, Clean Catch   Final    Color, UA 01/06/2022 Yellow  Yellow, Straw, Keily Final    Appearance, UA 01/06/2022 Clear  Clear Final    pH, UA 01/06/2022 6.0  5.0 - 8.0 Final    Specific Gravity, UA 01/06/2022 >1.030* 1.005 - 1.030 Final    Protein, UA 01/06/2022 1+* Negative Final    Glucose, UA 01/06/2022 Negative  Negative Final    Ketones, UA 01/06/2022 1+* Negative Final    Bilirubin (UA) 01/06/2022 Negative  Negative Final    Occult Blood UA 01/06/2022 Negative  Negative Final    Nitrite, UA 01/06/2022 Negative  Negative Final    Urobilinogen, UA 01/06/2022 Negative  <2.0 EU/dL Final    Leukocytes, UA 01/06/2022 Negative  Negative Final    Benzodiazepines 01/06/2022 Negative  Negative Final    Methadone metabolites 01/06/2022 Negative  Negative Final    Cocaine (Metab.) 01/06/2022 Negative  Negative Final    Opiate Scrn, Ur 01/06/2022 Negative  Negative Final    Barbiturate Screen, Ur 01/06/2022 Negative  Negative Final    Amphetamine Screen, Ur 01/06/2022 Negative  Negative Final    THC 01/06/2022 Presumptive Positive* Negative Final    Phencyclidine 01/06/2022 Negative  Negative Final    Creatinine, Urine 01/06/2022 >450.0* 23.0 - 375.0 mg/dL Final    Toxicology Information 01/06/2022 SEE COMMENT   Final    Alcohol, Serum 01/06/2022 <10  <10 mg/dL Final    Acetaminophen (Tylenol), Serum 01/06/2022 <3.0* 10.0 - 20.0 ug/mL Final    POC Rapid COVID 01/06/2022 Negative  Negative Final     Acceptable 01/06/2022 Yes   Final    RBC, UA 01/06/2022 3  0 - 4 /hpf Final    WBC, UA 01/06/2022 2  0 - 5 /hpf Final    Bacteria 01/06/2022 None  None-Occ /hpf Final     ERIC Owen 01/06/2022 None  0-1/lpf /lpf Final    Microscopic Comment 01/06/2022 SEE COMMENT   Final         Discharged Condition: stable and improved; not currently a danger to self/others or gravely disabled    Disposition: Home or Self Care    Is patient being discharged on multiple neuroleptics? No    Follow Up/Patient Instructions:     · Take all medications as prescribed.  · Attend all psychiatric and medical follow up appointments.   · Abstain from all drugs and alcohol.  · Call the crisis line at: [phone] for help in a crisis and emergent situations or call 911 and Return to ED for any acute worsening of your condition including suicidal or homicidal ideations      No discharge procedures on file.        Medications:  Reconciled Home Medications:      Medication List      START taking these medications    EScitalopram oxalate 10 MG tablet  Commonly known as: LEXAPRO  Take 1 tablet (10 mg total) by mouth once daily.  Start taking on: January 12, 2022        STOP taking these medications    ibuprofen 600 MG tablet  Commonly known as: ADVIL,MOTRIN     ondansetron 4 MG tablet  Commonly known as: ZOFRAN          Follow up at local [facility]- see SW/dischare notes for full detail    Diet: regular     Activity as tolerated    Total time spent discharging patient: 35 minutes    Deniz Castro MD  Psychiatry

## 2023-07-21 ENCOUNTER — HOSPITAL ENCOUNTER (EMERGENCY)
Facility: HOSPITAL | Age: 23
Discharge: HOME | End: 2023-07-21
Attending: STUDENT IN AN ORGANIZED HEALTH CARE EDUCATION/TRAINING PROGRAM
Payer: MEDICAID

## 2023-07-21 DIAGNOSIS — J02.9 VIRAL PHARYNGITIS: Primary | ICD-10-CM

## 2023-07-21 DIAGNOSIS — J02.9 PHARYNGITIS, UNSPECIFIED ETIOLOGY: ICD-10-CM

## 2023-07-21 LAB
ALBUMIN SERPL-MCNC: 4.2 G/DL (ref 3.3–5.5)
ALLENS TEST: ABNORMAL
ALP SERPL-CCNC: 73 U/L (ref 42–141)
BILIRUB SERPL-MCNC: 0.4 MG/DL (ref 0.2–1.6)
BILIRUBIN, POC UA: NEGATIVE
BLOOD, POC UA: ABNORMAL
BUN SERPL-MCNC: 12 MG/DL (ref 7–22)
CALCIUM SERPL-MCNC: 9.4 MG/DL (ref 8–10.3)
CHLORIDE SERPL-SCNC: 104 MMOL/L (ref 98–108)
CLARITY, POC UA: CLEAR
COLOR, POC UA: YELLOW
CREAT SERPL-MCNC: 1 MG/DL (ref 0.6–1.2)
CTP QC/QA: YES
CTP QC/QA: YES
GLUCOSE SERPL-MCNC: 129 MG/DL (ref 73–118)
GLUCOSE, POC UA: NEGATIVE
HCO3 UR-SCNC: 28.5 MMOL/L (ref 24–28)
HETEROPH AB SER QL: NEGATIVE
INFLUENZA A ANTIGEN, POC: NEGATIVE
INFLUENZA B ANTIGEN, POC: NEGATIVE
KETONES, POC UA: NEGATIVE
LDH SERPL L TO P-CCNC: 2.31 MMOL/L (ref 0.5–2.2)
LEUKOCYTE EST, POC UA: NEGATIVE
NITRITE, POC UA: NEGATIVE
PCO2 BLDA: 41.7 MMHG (ref 35–45)
PH SMN: 7.44 [PH] (ref 7.35–7.45)
PH UR STRIP: 5.5 [PH]
PO2 BLDA: 38 MMHG (ref 40–60)
POC ALT (SGPT): 112 U/L (ref 10–47)
POC AST (SGOT): 83 U/L (ref 11–38)
POC BE: 4 MMOL/L
POC RAPID STREP A: NEGATIVE
POC SATURATED O2: 74 % (ref 95–100)
POC TCO2: 27 MMOL/L (ref 18–33)
POC TCO2: 30 MMOL/L (ref 24–29)
POTASSIUM BLD-SCNC: 4.9 MMOL/L (ref 3.6–5.1)
PROTEIN, POC UA: ABNORMAL
PROTEIN, POC: 7.8 G/DL (ref 6.4–8.1)
SAMPLE: ABNORMAL
SARS-COV-2 RDRP RESP QL NAA+PROBE: NEGATIVE
SITE: ABNORMAL
SODIUM BLD-SCNC: 142 MMOL/L (ref 128–145)
SPECIFIC GRAVITY, POC UA: >=1.03
UROBILINOGEN, POC UA: 0.2 E.U./DL

## 2023-07-21 PROCEDURE — 87040 BLOOD CULTURE FOR BACTERIA: CPT | Performed by: STUDENT IN AN ORGANIZED HEALTH CARE EDUCATION/TRAINING PROGRAM

## 2023-07-21 PROCEDURE — 25000003 PHARM REV CODE 250: Mod: ER | Performed by: INTERNAL MEDICINE

## 2023-07-21 PROCEDURE — 82803 BLOOD GASES ANY COMBINATION: CPT | Mod: ER

## 2023-07-21 PROCEDURE — 87635 SARS-COV-2 COVID-19 AMP PRB: CPT | Mod: ER | Performed by: NURSE PRACTITIONER

## 2023-07-21 PROCEDURE — 99285 EMERGENCY DEPT VISIT HI MDM: CPT | Mod: 25,ER

## 2023-07-21 PROCEDURE — 96374 THER/PROPH/DIAG INJ IV PUSH: CPT | Mod: ER

## 2023-07-21 PROCEDURE — 96361 HYDRATE IV INFUSION ADD-ON: CPT | Mod: ER

## 2023-07-21 PROCEDURE — 86308 HETEROPHILE ANTIBODY SCREEN: CPT | Mod: ER

## 2023-07-21 PROCEDURE — 25000003 PHARM REV CODE 250: Mod: ER | Performed by: STUDENT IN AN ORGANIZED HEALTH CARE EDUCATION/TRAINING PROGRAM

## 2023-07-21 PROCEDURE — 83605 ASSAY OF LACTIC ACID: CPT | Mod: ER

## 2023-07-21 PROCEDURE — 63600175 PHARM REV CODE 636 W HCPCS: Mod: ER | Performed by: INTERNAL MEDICINE

## 2023-07-21 PROCEDURE — 80053 COMPREHEN METABOLIC PANEL: CPT | Mod: ER

## 2023-07-21 PROCEDURE — 87804 INFLUENZA ASSAY W/OPTIC: CPT | Mod: ER

## 2023-07-21 PROCEDURE — 87070 CULTURE OTHR SPECIMN AEROBIC: CPT | Mod: 59 | Performed by: INTERNAL MEDICINE

## 2023-07-21 PROCEDURE — 25500020 PHARM REV CODE 255: Mod: ER | Performed by: STUDENT IN AN ORGANIZED HEALTH CARE EDUCATION/TRAINING PROGRAM

## 2023-07-21 RX ORDER — ACETAMINOPHEN 500 MG
1000 TABLET ORAL
Status: COMPLETED | OUTPATIENT
Start: 2023-07-21 | End: 2023-07-21

## 2023-07-21 RX ORDER — IBUPROFEN 800 MG/1
800 TABLET ORAL EVERY 8 HOURS PRN
Qty: 30 TABLET | Refills: 0 | Status: SHIPPED | OUTPATIENT
Start: 2023-07-21

## 2023-07-21 RX ORDER — KETOROLAC TROMETHAMINE 30 MG/ML
30 INJECTION, SOLUTION INTRAMUSCULAR; INTRAVENOUS
Status: COMPLETED | OUTPATIENT
Start: 2023-07-21 | End: 2023-07-21

## 2023-07-21 RX ADMIN — KETOROLAC TROMETHAMINE 30 MG: 30 INJECTION, SOLUTION INTRAMUSCULAR; INTRAVENOUS at 08:07

## 2023-07-21 RX ADMIN — ACETAMINOPHEN 1000 MG: 500 TABLET, FILM COATED ORAL at 06:07

## 2023-07-21 RX ADMIN — SODIUM CHLORIDE 1000 ML: 9 INJECTION, SOLUTION INTRAVENOUS at 08:07

## 2023-07-21 RX ADMIN — IOHEXOL 100 ML: 350 INJECTION, SOLUTION INTRAVENOUS at 06:07

## 2023-07-21 NOTE — ED PROVIDER NOTES
Encounter Date: 7/21/2023    SCRIBE #1 NOTE: I, Anjali Pedraza, am scribing for, and in the presence of,  Thao Badillo DO. I have scribed the following portions of the note - Other sections scribed: HPI; ROS; PE.     History     Chief Complaint   Patient presents with    Sore Throat     C/o sore throat x2 weeks. Pt reports throat swelling. Went to urgent care this morning, given steroid shot. Pt instructed to return to ER for check up.     Casey Sen III is a 23 y.o. male with no pertinent medical Hx who presents to the ED for chief complaint of sore throat and low abdominal pain onset today. Associated symptoms are dysuria, nausea, and vomiting. Patient reports he had similar symptoms on 07/04 and was diagnosed with a bacterial infection, but did not complete his antibiotics. He states since then his throat has felt scratchy and began to have pain yesterday and swelling 2 hours PTA. Patient went to Urgent Care prior to coming here where he was given a steroid injection and breathing treatment and was going to be transferred via EMS, but he declined. He rates the abdominal pain as 8/10. Patient notes his vomit is yellow. No further complaints at this time. Patient does not have any medical allergies.       The history is provided by the patient. No  was used.   Review of patient's allergies indicates:  No Known Allergies  Past Medical History:   Diagnosis Date    Otitis      History reviewed. No pertinent surgical history.  Family History   Problem Relation Age of Onset    No Known Problems Mother     No Known Problems Father      Social History     Tobacco Use    Smoking status: Some Days    Smokeless tobacco: Never    Tobacco comments:     Vaccinations up to date. Patient is in 6th grade.    Substance Use Topics    Alcohol use: No    Drug use: Yes     Types: Marijuana     Review of Systems   Constitutional:  Negative for chills and fever.   HENT:  Positive for sore throat.  Negative for drooling, facial swelling and trouble swallowing.    Eyes:  Negative for redness and visual disturbance.   Respiratory:  Negative for cough, shortness of breath and stridor.    Cardiovascular:  Negative for chest pain.   Gastrointestinal:  Positive for abdominal pain, nausea and vomiting.   Genitourinary:  Positive for dysuria. Negative for hematuria.   Musculoskeletal:  Negative for gait problem and neck stiffness.   Skin:  Negative for pallor and wound.   Neurological:  Negative for syncope, facial asymmetry, speech difficulty and weakness.   Psychiatric/Behavioral:  Negative for confusion.    All other systems reviewed and are negative.    Physical Exam     Initial Vitals [07/21/23 1710]   BP Pulse Resp Temp SpO2   (!) 153/96 108 20 98.9 °F (37.2 °C) 97 %      MAP       --         Physical Exam    Nursing note and vitals reviewed.  Constitutional: Vital signs are normal. He appears well-developed and well-nourished. He is not diaphoretic. He is Obese .  Non-toxic appearance. He appears ill.   HENT:   Head: Normocephalic and atraumatic.   Right Ear: External ear normal.   Left Ear: External ear normal.   Mouth/Throat: Uvula is midline and mucous membranes are normal. Posterior oropharyngeal edema and posterior oropharyngeal erythema present. No oropharyngeal exudate.   Swelling to right tonsil.    Eyes: Conjunctivae and EOM are normal. Pupils are equal, round, and reactive to light. No scleral icterus.   Neck: Trachea normal and phonation normal. Neck supple. No tracheal tenderness present. No tracheal deviation present. No JVD present.   Normal range of motion.  Cardiovascular:  Normal rate, regular rhythm, normal heart sounds and intact distal pulses.           Pulmonary/Chest: Breath sounds normal. No stridor. No respiratory distress.   Speaking in clear full sentences. No stridor.    Abdominal: Abdomen is soft. He exhibits no distension. There is abdominal tenderness in the right lower quadrant,  suprapubic area and left lower quadrant.   Lower quadrant abdominal tenderness with palpation.  There is no rebound and no guarding.   Musculoskeletal:         General: No tenderness or edema. Normal range of motion.      Cervical back: Normal range of motion and neck supple. No edema, erythema or rigidity. No spinous process tenderness or muscular tenderness. Normal range of motion.     Neurological: He is alert. He has normal strength. He displays no atrophy and no tremor. He exhibits normal muscle tone. He displays no seizure activity. Gait normal.   Skin: Skin is warm and dry. No rash noted. No erythema.       ED Course   Procedures        Labs Reviewed   POCT URINALYSIS W/O SCOPE - Abnormal; Notable for the following components:       Result Value    Spec Grav UA >=1.030 (*)     Blood, UA 1+ (*)     Protein, UA Trace (*)     All other components within normal limits   ISTAT PROCEDURE - Abnormal; Notable for the following components:    POC PO2 38 (*)     POC HCO3 28.5 (*)     POC SATURATED O2 74 (*)     POC Lactate 2.31 (*)     POC TCO2 30 (*)     All other components within normal limits   POCT CMP - Abnormal; Notable for the following components:    ALT (SGPT),  (*)     AST (SGOT), POC 83 (*)     POC Glucose 129 (*)     All other components within normal limits   CULTURE, BLOOD   CULTURE, BLOOD   CULTURE, RESPIRATORY  - THROAT   SARS-COV-2 RDRP GENE    Narrative:     This test utilizes isothermal nucleic acid amplification technology to detect the SARS-CoV-2 RdRp nucleic acid segment. The analytical sensitivity (limit of detection) is 500 copies/swab.     A POSITIVE result is indicative of the presence of SARS-CoV-2 RNA; clinical correlation with patient history and other diagnostic information is necessary to determine patient infection status.    A NEGATIVE result means that SARS-CoV-2 nucleic acids are not present above the limit of detection. A NEGATIVE result should be treated as presumptive. It  does not rule out the possibility of COVID-19 and should not be the sole basis for treatment decisions. If COVID-19 is strongly suspected based on clinical and exposure history, re-testing using an alternate molecular assay should be considered.     This test is only for use under the Food and Drug Administration s Emergency Use Authorization (EUA).     Commercial kits are provided by [employer]. Performance characteristics of the EUA have been independently verified by [facility] Department of Pathology and Laboratory Medicine.   _________________________________________________________________   The authorized Fact Sheet for Healthcare Providers and the authorized Fact Sheet for Patients of the ID NOW COVID-19 are available on the FDA website:    https://www.fda.gov/media/724901/download      https://www.fda.gov/media/774012/download      POCT CBC   POCT URINALYSIS W/O SCOPE   POCT INFECTIOUS MONONUCLEOSIS   POCT STREP A MOLECULAR   POCT INFLUENZA A/B MOLECULAR   POCT CMP   POCT STREP A, RAPID   POCT RAPID INFLUENZA A/B          Imaging Results              CT Soft Tissue Neck With Contrast (Final result)  Result time 07/21/23 19:23:21      Final result by Caroline Rainey MD (07/21/23 19:23:21)                   Impression:      Minimal asymmetrical prominence of the right palatine tonsil with out the normal pattern of striations within the palatine tonsils, when acute tonsillitis is present.  Findings may represent atypical tonsillitis or resolving tonsillitis involving the right palatine tonsil.  Recommend correlation with laboratory data and examination.      Electronically signed by: Caroline Rainey  Date:    07/21/2023  Time:    19:23               Narrative:    EXAMINATION:  CT soft tissue neck with contrast    CLINICAL HISTORY:  Epiglottitis or tonsillitis suspected;    TECHNIQUE:  2.5 and 1.25 mm axial images were obtained through the soft tissues of the neck.  Coronal and sagittal  reformatted images were provided.  Wondered cc Omnipaque was injected.  The    COMPARISON:  None.    FINDINGS:  Examination is limited by metallic streak artifact.  The right palatine tonsil appears minimally more prominent than that of the left.  However, the typical pattern of striations within the palatine tonsils is not seen.  The airway is patent.  The epiglottis is not thickened.  There is no peritonsillar abscess.  There are non pathologically enlarged cervical chain lymph nodes.  A mucous retention cyst or polyp is seen in the left maxillary sinus.                                       Medications   iohexoL (OMNIPAQUE 350) injection 100 mL (100 mLs Intravenous Given 7/21/23 1832)   acetaminophen tablet 1,000 mg (1,000 mg Oral Given 7/21/23 1824)   sodium chloride 0.9% bolus 1,000 mL 1,000 mL (0 mLs Intravenous Stopped 7/21/23 2100)   ketorolac injection 30 mg (30 mg Intravenous Given 7/21/23 2008)     Medical Decision Making:   History:   Old Medical Records: I decided to obtain old medical records.  Old Records Summarized: other records.       <> Summary of Records: External documents reviewed.   Clinical Tests:   Lab Tests: Ordered and Reviewed  ED Management:   [facility]  This is an emergent evaluation of a 23 y.o. male with no pertinent medical Hx who presents to the ED for chief complaint of sore throat and low abdominal pain onset today. Initial vitals in the ED  [07/21/23 1710]  BP: (!) 153/96  Pulse: 108  Resp: 20  Temp: 98.9 °F (37.2 °C)  SpO2: 97 % .     Physical exam noted above. DDx includes but is not limited to epiglottitis, tonsillitis, retropharyngeal abscess, deep neck soft tissue abscess, sepsis. Also considered but clinically less likely to be meningitis, ACS, dissection. Will obtain labs and imaging including CBC, CMP, lactic acid, blood cultures, CT soft tissue neck and UA. Will also provide Tylenol for fever and pain. Will continue to monitor and frequently reassess pending results of labs,  treatments and final disposition.    Patient is aware of plan and is amenable.       UPDATE:  Labs reveal a mild leukocytosis.  Elevated lactic acid.  Mildly elevated LFTs.  Remainder of labs including flu COVID and strep unremarkable.  UA, CT soft tissue neck pending.  Repeat temperature while in the .3.  Patient was treated with p.o. Tylenol.  Will also treat with IV fluid hydration.  Will sign patient out to Dr. wilkinson pending fluid hydration and final workup.    Thao Badillo D.O  EMERGENCY MEDICINE  7:28 PM 07/21/2023      This chart was completed using dictation software, as a result there may be some transcription errors   CT of soft tissues of the neck with contrast revealed no evidence of abscess, but showed suggestion tonsillitis.  Patient received Tylenol, normal saline bolus, Toradol in the emergency department states he feels much better after medications fluids.  Instructions for tonsillitis were given as well as a prescription for Zithromax.  Throat culture was sent to lab and patient was advised to follow with his primary care physician within the next 3 days for re-evaluation/return to the emergency department if condition worsens.        Scribe Attestation:   Scribe #1: I performed the above scribed service and the documentation accurately describes the services I performed. I attest to the accuracy of the note.            Scribe attestation: I, Preet Wilkinson MD, personally performed the services described in this documentation.  All medical record entries made by the scribe were at my direction and in my presence.  I have reviewed the chart and agree that the record reflects my personal performance and is accurate and complete.         Clinical Impression:   Final diagnoses:  [J02.9] Viral pharyngitis (Primary)  [J02.9] Pharyngitis, unspecified etiology        ED Disposition Condition    Discharge Stable          ED Prescriptions       Medication Sig Dispense Start Date End Date  Auth. Provider    ibuprofen (ADVIL,MOTRIN) 800 MG tablet Take 1 tablet (800 mg total) by mouth every 8 (eight) hours as needed for Pain. 30 tablet 7/21/2023 -- Preet Queen MD          Follow-up Information       Follow up With Specialties Details Why Contact Info    Deniz Gomez MD Pediatrics Schedule an appointment as soon as possible for a visit in 3 days For reevaluation [address] [place] [address]  [id number]  [place] [address]  [phone]               Preet Queen MD  07/22/23 0414

## 2023-07-22 VITALS
HEIGHT: 68 IN | RESPIRATION RATE: 20 BRPM | WEIGHT: 232.81 LBS | SYSTOLIC BLOOD PRESSURE: 129 MMHG | HEART RATE: 82 BPM | BODY MASS INDEX: 35.28 KG/M2 | OXYGEN SATURATION: 99 % | TEMPERATURE: 99 F | DIASTOLIC BLOOD PRESSURE: 77 MMHG

## 2023-07-24 LAB — BACTERIA THROAT CULT: NORMAL

## 2023-07-26 ENCOUNTER — HOSPITAL ENCOUNTER (EMERGENCY)
Facility: HOSPITAL | Age: 23
Discharge: HOME | End: 2023-07-27
Attending: EMERGENCY MEDICINE
Payer: MEDICAID

## 2023-07-26 DIAGNOSIS — J36 PERITONSILLAR ABSCESS: Primary | ICD-10-CM

## 2023-07-26 DIAGNOSIS — J03.90 TONSILLITIS: ICD-10-CM

## 2023-07-26 LAB
ALBUMIN SERPL BCP-MCNC: 4.3 G/DL (ref 3.5–5.2)
ALP SERPL-CCNC: 67 U/L (ref 55–135)
ALT SERPL W/O P-5'-P-CCNC: 48 U/L (ref 10–44)
ANION GAP SERPL CALC-SCNC: 11 MMOL/L (ref 8–16)
AST SERPL-CCNC: 24 U/L (ref 10–40)
BACTERIA BLD CULT: NORMAL
BACTERIA BLD CULT: NORMAL
BASOPHILS # BLD AUTO: 0.03 K/UL (ref 0–0.2)
BASOPHILS NFR BLD: 0.2 % (ref 0–1.9)
BILIRUB SERPL-MCNC: 0.9 MG/DL (ref 0.1–1)
BUN SERPL-MCNC: 12 MG/DL (ref 6–20)
CALCIUM SERPL-MCNC: 10.1 MG/DL (ref 8.7–10.5)
CHLORIDE SERPL-SCNC: 100 MMOL/L (ref 95–110)
CO2 SERPL-SCNC: 27 MMOL/L (ref 23–29)
CREAT SERPL-MCNC: 1 MG/DL (ref 0.5–1.4)
CTP QC/QA: YES
DIFFERENTIAL METHOD: ABNORMAL
EOSINOPHIL # BLD AUTO: 0 K/UL (ref 0–0.5)
EOSINOPHIL NFR BLD: 0 % (ref 0–8)
ERYTHROCYTE [DISTWIDTH] IN BLOOD BY AUTOMATED COUNT: 12.6 % (ref 11.5–14.5)
EST. GFR  (NO RACE VARIABLE): >60 ML/MIN/1.73 M^2
GLUCOSE SERPL-MCNC: 98 MG/DL (ref 70–110)
HCT VFR BLD AUTO: 43.5 % (ref 40–54)
HETEROPH AB SERPL QL IA: NEGATIVE
HGB BLD-MCNC: 14 G/DL (ref 14–18)
IMM GRANULOCYTES # BLD AUTO: 0.05 K/UL (ref 0–0.04)
IMM GRANULOCYTES NFR BLD AUTO: 0.4 % (ref 0–0.5)
LYMPHOCYTES # BLD AUTO: 1.1 K/UL (ref 1–4.8)
LYMPHOCYTES NFR BLD: 8.7 % (ref 18–48)
MCH RBC QN AUTO: 28.5 PG (ref 27–31)
MCHC RBC AUTO-ENTMCNC: 32.2 G/DL (ref 32–36)
MCV RBC AUTO: 88 FL (ref 82–98)
MOLECULAR STREP A: NEGATIVE
MONOCYTES # BLD AUTO: 0.9 K/UL (ref 0.3–1)
MONOCYTES NFR BLD: 6.9 % (ref 4–15)
NEUTROPHILS # BLD AUTO: 10.8 K/UL (ref 1.8–7.7)
NEUTROPHILS NFR BLD: 83.8 % (ref 38–73)
NRBC BLD-RTO: 0 /100 WBC
PLATELET # BLD AUTO: 291 K/UL (ref 150–450)
PMV BLD AUTO: 9.3 FL (ref 9.2–12.9)
POC MOLECULAR INFLUENZA A AGN: NEGATIVE
POC MOLECULAR INFLUENZA B AGN: NEGATIVE
POTASSIUM SERPL-SCNC: 4.2 MMOL/L (ref 3.5–5.1)
PROT SERPL-MCNC: 7.8 G/DL (ref 6–8.4)
RBC # BLD AUTO: 4.92 M/UL (ref 4.6–6.2)
SARS-COV-2 RDRP RESP QL NAA+PROBE: NEGATIVE
SODIUM SERPL-SCNC: 138 MMOL/L (ref 136–145)
WBC # BLD AUTO: 12.85 K/UL (ref 3.9–12.7)

## 2023-07-26 PROCEDURE — 86308 HETEROPHILE ANTIBODY SCREEN: CPT | Performed by: PHYSICIAN ASSISTANT

## 2023-07-26 PROCEDURE — 25000003 PHARM REV CODE 250: Performed by: EMERGENCY MEDICINE

## 2023-07-26 PROCEDURE — 96361 HYDRATE IV INFUSION ADD-ON: CPT

## 2023-07-26 PROCEDURE — 85025 COMPLETE CBC W/AUTO DIFF WBC: CPT | Performed by: PHYSICIAN ASSISTANT

## 2023-07-26 PROCEDURE — 96375 TX/PRO/DX INJ NEW DRUG ADDON: CPT

## 2023-07-26 PROCEDURE — 87502 INFLUENZA DNA AMP PROBE: CPT

## 2023-07-26 PROCEDURE — 80053 COMPREHEN METABOLIC PANEL: CPT | Performed by: PHYSICIAN ASSISTANT

## 2023-07-26 PROCEDURE — 25500020 PHARM REV CODE 255: Performed by: PHYSICIAN ASSISTANT

## 2023-07-26 PROCEDURE — 63600175 PHARM REV CODE 636 W HCPCS: Performed by: PHYSICIAN ASSISTANT

## 2023-07-26 PROCEDURE — 25000003 PHARM REV CODE 250: Performed by: PHYSICIAN ASSISTANT

## 2023-07-26 PROCEDURE — 99284 EMERGENCY DEPT VISIT MOD MDM: CPT | Mod: 25

## 2023-07-26 PROCEDURE — 87635 SARS-COV-2 COVID-19 AMP PRB: CPT | Performed by: EMERGENCY MEDICINE

## 2023-07-26 PROCEDURE — 87651 STREP A DNA AMP PROBE: CPT

## 2023-07-26 PROCEDURE — 96374 THER/PROPH/DIAG INJ IV PUSH: CPT

## 2023-07-26 RX ORDER — KETOROLAC TROMETHAMINE 30 MG/ML
15 INJECTION, SOLUTION INTRAMUSCULAR; INTRAVENOUS
Status: COMPLETED | OUTPATIENT
Start: 2023-07-26 | End: 2023-07-26

## 2023-07-26 RX ORDER — CLINDAMYCIN PHOSPHATE 600 MG/50ML
600 INJECTION, SOLUTION INTRAVENOUS ONCE
Status: DISCONTINUED | OUTPATIENT
Start: 2023-07-26 | End: 2023-07-26

## 2023-07-26 RX ORDER — CLINDAMYCIN HYDROCHLORIDE 150 MG/1
300 CAPSULE ORAL
Status: DISCONTINUED | OUTPATIENT
Start: 2023-07-26 | End: 2023-07-27 | Stop reason: HOSPADM

## 2023-07-26 RX ORDER — ONDANSETRON 4 MG/1
4 TABLET, ORALLY DISINTEGRATING ORAL
Status: COMPLETED | OUTPATIENT
Start: 2023-07-26 | End: 2023-07-26

## 2023-07-26 RX ORDER — ACETAMINOPHEN 500 MG
1000 TABLET ORAL
Status: COMPLETED | OUTPATIENT
Start: 2023-07-26 | End: 2023-07-26

## 2023-07-26 RX ORDER — METHYLPREDNISOLONE SOD SUCC 125 MG
125 VIAL (EA) INJECTION
Status: COMPLETED | OUTPATIENT
Start: 2023-07-26 | End: 2023-07-26

## 2023-07-26 RX ADMIN — KETOROLAC TROMETHAMINE 15 MG: 30 INJECTION, SOLUTION INTRAMUSCULAR; INTRAVENOUS at 04:07

## 2023-07-26 RX ADMIN — METHYLPREDNISOLONE SODIUM SUCCINATE 125 MG: 125 INJECTION, POWDER, FOR SOLUTION INTRAMUSCULAR; INTRAVENOUS at 05:07

## 2023-07-26 RX ADMIN — ACETAMINOPHEN 1000 MG: 500 TABLET ORAL at 03:07

## 2023-07-26 RX ADMIN — IOHEXOL 75 ML: 350 INJECTION, SOLUTION INTRAVENOUS at 05:07

## 2023-07-26 RX ADMIN — SODIUM CHLORIDE 1000 ML: 9 INJECTION, SOLUTION INTRAVENOUS at 04:07

## 2023-07-26 RX ADMIN — ONDANSETRON 4 MG: 4 TABLET, ORALLY DISINTEGRATING ORAL at 03:07

## 2023-07-26 NOTE — FIRST PROVIDER EVALUATION
Medical screening examination initiated.  I have conducted a focused provider triage encounter, findings are as follows:    Brief history of present illness:  23 year old male    He was seen at the [facility] recently, told he had a virus. He reports he started to feel better but this morning felt worse. He woke up with fever, chills, vomiting, body aches. He took ibuprofen 800 mg between 8-9 am. When asked about abdominal pain he replies his whole body aches.      Vitals:    07/26/23 1524   BP: 136/75   BP Location: Left arm   Patient Position: Sitting   Pulse: 79   Resp: 17   Temp: 100.2 °F (37.9 °C)   TempSrc: Oral   SpO2: 99%   Weight: 94.8 kg (209 lb)       Pertinent physical exam:  Ambulatory with a steady gait, no respiratory distress, no drooling.     Brief workup plan:  Covid, Flu, Strep, Tylenol and Zofran.    Preliminary workup initiated; this workup will be continued and followed by the physician or advanced practice provider that is assigned to the patient when roomed.    Laura Sims MD   3:26 PM

## 2023-07-26 NOTE — Clinical Note
"Casey"Socorro Sen was seen and treated in our emergency department on 7/26/2023.  He may return to work on 07/28/2023.       If you have any questions or concerns, please don't hesitate to call.      Katerine Laboy MD"

## 2023-07-26 NOTE — ED PROVIDER NOTES
"Encounter Date: 7/26/2023    SCRIBE #1 NOTE: I, Elisa Sanjuanita, am scribing for, and in the presence of,  NIKOLAS Man.     History     Chief Complaint   Patient presents with    Generalized Body Aches     Body aches, sore throat, runny nose, fever, chills for over a week. Took motrin at 0830     24 yo M w/ no pertinent PMHx, presents to the ED w/ persistent sore throat since 3w ago. He was seen at outside ED facility and was diagnosed w/ tonsillitis on 7/22/23 and discharged w/ Zithromax, completed full course. Was COVID, influenza, strep, mononucleosis negative at the time. States his sore throat has still been persistent, worse in the "mid" throat, prompting his return to the ED. Also notes generalized myalgias. No other exacerbating or alleviating factors. Denies cough, or other associated symptoms.     CT soft tissue of neck 7/22/23:  FINDINGS:  Examination is limited by metallic streak artifact.  The right palatine tonsil appears minimally more prominent than that of the left.  However, the typical pattern of striations within the palatine tonsils is not seen.  The airway is patent.  The epiglottis is not thickened.  There is no peritonsillar abscess.  There are non pathologically enlarged cervical chain lymph nodes.  A mucous retention cyst or polyp is seen in the left maxillary sinus.    The history is provided by the patient, medical records and a parent.   Review of patient's allergies indicates:  No Known Allergies  Past Medical History:   Diagnosis Date    Otitis      No past surgical history on file.  Family History   Problem Relation Age of Onset    No Known Problems Mother     No Known Problems Father      Social History     Tobacco Use    Smoking status: Some Days    Smokeless tobacco: Never    Tobacco comments:     Vaccinations up to date. Patient is in 6th grade.    Substance Use Topics    Alcohol use: No    Drug use: Yes     Types: Marijuana     Review of Systems   Constitutional:  Negative " for chills and fever.   HENT:  Positive for sore throat. Negative for congestion and rhinorrhea.    Eyes:  Negative for visual disturbance.   Respiratory:  Negative for cough and shortness of breath.    Cardiovascular:  Negative for chest pain.   Gastrointestinal:  Negative for abdominal pain, diarrhea, nausea and vomiting.   Genitourinary:  Negative for dysuria, frequency and hematuria.   Musculoskeletal:  Positive for myalgias. Negative for back pain.   Skin:  Negative for rash.   Neurological:  Negative for dizziness, weakness and headaches.     Physical Exam     Initial Vitals [07/26/23 1524]   BP Pulse Resp Temp SpO2   136/75 79 17 100.2 °F (37.9 °C) 99 %      MAP       --         Physical Exam    Nursing note and vitals reviewed.  Constitutional: He appears well-developed and well-nourished. No distress.   HENT:   Head: Normocephalic.   Right Ear: External ear normal.   Left Ear: External ear normal.   Tonsils are erythematous, exudative, swollen bilaterally.    Eyes: Conjunctivae are normal.   Cardiovascular:  Normal rate.           Pulmonary/Chest: No respiratory distress.   Musculoskeletal:         General: Normal range of motion.     Neurological: He is alert.   Skin: Skin is warm and dry. No rash noted.   Psychiatric: He has a normal mood and affect. His behavior is normal. Judgment and thought content normal.       ED Course   Procedures  Labs Reviewed   CBC W/ AUTO DIFFERENTIAL - Abnormal; Notable for the following components:       Result Value    WBC 12.85 (*)     Gran # (ANC) 10.8 (*)     Immature Grans (Abs) 0.05 (*)     Gran % 83.8 (*)     Lymph % 8.7 (*)     All other components within normal limits   COMPREHENSIVE METABOLIC PANEL - Abnormal; Notable for the following components:    ALT 48 (*)     All other components within normal limits   HETEROPHILE AB SCREEN   SARS-COV-2 RDRP GENE   POCT INFLUENZA A/B MOLECULAR   POCT STREP A MOLECULAR          Imaging Results               CT Soft Tissue Neck  With Contrast (Final result)  Result time 07/26/23 17:55:53      Final result by Jhonny Young MD (07/26/23 17:55:53)                   Impression:      Findings suggestive of tonsillitis with small left peritonsillar abscess and questionable subcentimeter right peritonsillar abscess versus phlegmon.    Enlarged cervical chain lymph nodes, likely reactive.    This report was flagged in Epic as abnormal.      Electronically signed by: Jhonny Young MD  Date:    07/26/2023  Time:    17:55               Narrative:    EXAMINATION:  CT SOFT TISSUE NECK WITH CONTRAST    CLINICAL HISTORY:  Tonsil/adenoid disorder;    TECHNIQUE:  CT images obtained throughout the region of the neck after the administration of 75 mL Omnipaque 350 intravenous contrast. Axial, sagittal and coronal reconstructions were performed.    COMPARISON:  CT, 07/21/2023.    FINDINGS:  Prominence of the palatine tonsils and adenoids.  New small hypoattenuating collection in the left peritonsillar region measuring 11 x 7 mm in axial dimension (axial image 60).  Mild striated enhancement pattern of the bilateral tonsils.  Questionable smaller abscess versus phlegmon in the right palatine tonsil (axial image 61 and coronal image 68).    Neck soft tissues are otherwise unremarkable.  No retropharyngeal abscess or effusion.  Epiglottis is normal.  The parotid, submandibular, and thyroid glands are within normal limits.  Multiple enlarged cervical chain lymph nodes measuring up to 12 mm in short axis on the left (axial image 84).  Airway remains patent.    Major vessels of the neck appear patent. Limited intracranial evaluation is within normal limits.  Mucosal retention cyst in the left maxillary sinus.  Minimal mucosal thickening in the paranasal sinuses.  Otherwise, the visualized paranasal sinuses and mastoid air cells are essentially clear.    Lung apices are clear.    Bones demonstrate no aggressive osseous lesion.    Few small dental caries.                                        Medications   clindamycin capsule 300 mg (300 mg Oral Not Given 7/26/23 1930)   ondansetron disintegrating tablet 4 mg (4 mg Oral Given 7/26/23 1538)   acetaminophen tablet 1,000 mg (1,000 mg Oral Given 7/26/23 1538)   sodium chloride 0.9% bolus 1,000 mL 1,000 mL (0 mLs Intravenous Stopped 7/26/23 1724)   ketorolac injection 15 mg (15 mg Intravenous Given 7/26/23 1626)   methylPREDNISolone sodium succinate injection 125 mg (125 mg Intravenous Given 7/26/23 1707)   iohexoL (OMNIPAQUE 350) injection 75 mL (75 mLs Intravenous Given 7/26/23 1726)     Medical Decision Making:   History:   I obtained history from: someone other than patient.       <> Summary of History: Additional history is provided by independent historian: parent, medical record.   Old Medical Records: I decided to obtain old medical records.  Old Records Summarized: other records, records from previous admission(s) and records from another hospital.       <> Summary of Records: External records reviewed.   Initial Assessment:   23-year-old male presents to ED with worsening sore throat.  Patient was diagnosed with tonsillitis 5 days ago and discharged home with Zithromax.  Patient states he completed Zithromax and continues to have pain.  Patient also reports fever and body aches.  Patient reports difficulty swallowing.  He denies cough, chest pain, or trouble breathing.  On exam patient has bilateral erythematous tonsillar swelling with exudates.  No soft palate swelling.  CBC reveals mild leukocytosis.  Chemistries unremarkable.  CT soft tissue neck reveals developing left peritonsillar abscess.  Patient given IV fluids and Solu-Medrol IV in the ED. patient also given Toradol 15 mg IV.  Patient will be transferred to [facility] ED for ENT evaluation for drainage of peritonsillar abscess.  Transfer accepted.  Patient is stable for transfer.  Clinical Tests:   Lab Tests: Ordered and Reviewed        Scribe  Attestation:   Scribe #1: I performed the above scribed service and the documentation accurately describes the services I performed. I attest to the accuracy of the note.                 I, Thao Silva PA-C , personally performed the services described in this documentation. All medical record entries made by the scribe were at my direction and in my presence. I have reviewed the chart and agree that the record reflects my personal performance and is accurate and complete.    Clinical Impression:   Final diagnoses:  [J36] Peritonsillar abscess (Primary)  [J03.90] Tonsillitis        ED Disposition Condition    Transfer to Another Facility Stable                NIKOLAS Man  07/26/23 1953

## 2023-07-27 VITALS
TEMPERATURE: 99 F | BODY MASS INDEX: 31.78 KG/M2 | DIASTOLIC BLOOD PRESSURE: 65 MMHG | RESPIRATION RATE: 16 BRPM | SYSTOLIC BLOOD PRESSURE: 122 MMHG | WEIGHT: 209 LBS | HEART RATE: 72 BPM | OXYGEN SATURATION: 95 %

## 2023-07-27 RX ORDER — CLINDAMYCIN HYDROCHLORIDE 150 MG/1
300 CAPSULE ORAL EVERY 6 HOURS
Qty: 56 CAPSULE | Refills: 0 | Status: SHIPPED | OUTPATIENT
Start: 2023-07-27 | End: 2023-07-27 | Stop reason: SDUPTHER

## 2023-07-27 RX ORDER — DEXAMETHASONE 6 MG/1
8 TABLET ORAL EVERY OTHER DAY
Qty: 8 TABLET | Refills: 0 | Status: SHIPPED | OUTPATIENT
Start: 2023-07-27 | End: 2023-08-05

## 2023-07-27 RX ORDER — DEXAMETHASONE 6 MG/1
8 TABLET ORAL EVERY OTHER DAY
Qty: 11 TABLET | Refills: 0 | Status: SHIPPED | OUTPATIENT
Start: 2023-07-27 | End: 2023-07-27 | Stop reason: SDUPTHER

## 2023-07-27 RX ORDER — CLINDAMYCIN HYDROCHLORIDE 150 MG/1
300 CAPSULE ORAL EVERY 6 HOURS
Qty: 80 CAPSULE | Refills: 0 | Status: SHIPPED | OUTPATIENT
Start: 2023-07-27 | End: 2023-08-06

## 2023-07-27 NOTE — ED TRIAGE NOTES
Casey Sen III, a 23 y.o. male presents to the ED as transfer from  for ENT consult w/ complaint of PTA. Pt states sore throat x 1 week, pain  4/10    Triage note:  Chief Complaint   Patient presents with    Generalized Body Aches     Body aches, sore throat, runny nose, fever, chills for over a week. Took motrin at 0830    Transfer     Transfer from  for PTA     Review of patient's allergies indicates:  No Known Allergies  Past Medical History:   Diagnosis Date    Otitis

## 2023-07-27 NOTE — ED PROVIDER NOTES
Encounter Date: 7/26/2023       History     Chief Complaint   Patient presents with    Generalized Body Aches     Body aches, sore throat, runny nose, fever, chills for over a week. Took motrin at 0830    Transfer     Transfer from  for PTA     23-year-old male presents to ED with worsening sore throat for the past 2 weeks.  States that this is the patient states that this is his 3rd time being evaluated for the same complaints.  Patient was diagnosed with tonsillitis 5 days ago and discharged home with Zithromax.  Patient states he completed Zithromax and continues to have pain.  Patient also reports fever and body aches.  Patient reports difficulty swallowing.  He denies cough, chest pain, or trouble breathing.  He was sent from [facility] emergency department to [facility] for evaluation by ENT for peritonsillar abscess noted on CT soft tissue neck.    The history is provided by the patient. No  was used.   Review of patient's allergies indicates:  No Known Allergies  Past Medical History:   Diagnosis Date    Otitis      History reviewed. No pertinent surgical history.  Family History   Problem Relation Age of Onset    No Known Problems Mother     No Known Problems Father      Social History     Tobacco Use    Smoking status: Some Days    Smokeless tobacco: Never    Tobacco comments:     Vaccinations up to date. Patient is in 6th grade.    Substance Use Topics    Alcohol use: No    Drug use: Yes     Types: Marijuana     Review of Systems   Constitutional:         See HPI     Physical Exam     Initial Vitals [07/26/23 1524]   BP Pulse Resp Temp SpO2   136/75 79 17 100.2 °F (37.9 °C) 99 %      MAP       --         Physical Exam    Nursing note and vitals reviewed.  Constitutional: He appears well-developed and well-nourished. He is not diaphoretic. No distress.   HENT:   Head: Normocephalic and atraumatic.   Airway is patent.  Right-sided exudate.  Uvula midline.  Mild erythema of oropharynx    Eyes: Conjunctivae and EOM are normal.   Neck: Neck supple.   Normal range of motion.  Cardiovascular:  Normal rate, regular rhythm, normal heart sounds and intact distal pulses.           Pulmonary/Chest: Breath sounds normal. No respiratory distress. He has no wheezes. He has no rhonchi. He has no rales.   Abdominal: Abdomen is soft. Bowel sounds are normal. He exhibits no distension. There is no abdominal tenderness. There is no rebound and no guarding.   Musculoskeletal:         General: No tenderness or edema. Normal range of motion.      Cervical back: Normal range of motion and neck supple.     Neurological: He is alert. He has normal strength.   Skin: Skin is warm and dry.   Psychiatric: He has a normal mood and affect. Thought content normal.       ED Course   Procedures  Labs Reviewed   CBC W/ AUTO DIFFERENTIAL - Abnormal; Notable for the following components:       Result Value    WBC 12.85 (*)     Gran # (ANC) 10.8 (*)     Immature Grans (Abs) 0.05 (*)     Gran % 83.8 (*)     Lymph % 8.7 (*)     All other components within normal limits   COMPREHENSIVE METABOLIC PANEL - Abnormal; Notable for the following components:    ALT 48 (*)     All other components within normal limits   HETEROPHILE AB SCREEN   SARS-COV-2 RDRP GENE   POCT INFLUENZA A/B MOLECULAR   POCT STREP A MOLECULAR          Imaging Results               CT Soft Tissue Neck With Contrast (Final result)  Result time 07/26/23 17:55:53      Final result by Jhonny Young MD (07/26/23 17:55:53)                   Impression:      Findings suggestive of tonsillitis with small left peritonsillar abscess and questionable subcentimeter right peritonsillar abscess versus phlegmon.    Enlarged cervical chain lymph nodes, likely reactive.    This report was flagged in Epic as abnormal.      Electronically signed by: Jhonny Young MD  Date:    07/26/2023  Time:    17:55               Narrative:    EXAMINATION:  CT SOFT TISSUE NECK WITH  CONTRAST    CLINICAL HISTORY:  Tonsil/adenoid disorder;    TECHNIQUE:  CT images obtained throughout the region of the neck after the administration of 75 mL Omnipaque 350 intravenous contrast. Axial, sagittal and coronal reconstructions were performed.    COMPARISON:  CT, 07/21/2023.    FINDINGS:  Prominence of the palatine tonsils and adenoids.  New small hypoattenuating collection in the left peritonsillar region measuring 11 x 7 mm in axial dimension (axial image 60).  Mild striated enhancement pattern of the bilateral tonsils.  Questionable smaller abscess versus phlegmon in the right palatine tonsil (axial image 61 and coronal image 68).    Neck soft tissues are otherwise unremarkable.  No retropharyngeal abscess or effusion.  Epiglottis is normal.  The parotid, submandibular, and thyroid glands are within normal limits.  Multiple enlarged cervical chain lymph nodes measuring up to 12 mm in short axis on the left (axial image 84).  Airway remains patent.    Major vessels of the neck appear patent. Limited intracranial evaluation is within normal limits.  Mucosal retention cyst in the left maxillary sinus.  Minimal mucosal thickening in the paranasal sinuses.  Otherwise, the visualized paranasal sinuses and mastoid air cells are essentially clear.    Lung apices are clear.    Bones demonstrate no aggressive osseous lesion.    Few small dental caries.                                       Medications   clindamycin capsule 300 mg (300 mg Oral Not Given 7/26/23 1930)   ondansetron disintegrating tablet 4 mg (4 mg Oral Given 7/26/23 1538)   acetaminophen tablet 1,000 mg (1,000 mg Oral Given 7/26/23 1538)   sodium chloride 0.9% bolus 1,000 mL 1,000 mL (0 mLs Intravenous Stopped 7/26/23 1724)   ketorolac injection 15 mg (15 mg Intravenous Given 7/26/23 1626)   methylPREDNISolone sodium succinate injection 125 mg (125 mg Intravenous Given 7/26/23 1707)   iohexoL (OMNIPAQUE 350) injection 75 mL (75 mLs Intravenous  Given 7/26/23 1726)     Medical Decision Making:   History:   Old Medical Records: I decided to obtain old medical records.  Old Records Summarized: records from previous admission(s).       <> Summary of Records: CT soft tissue neck reveals developing left peritonsillar abscess.  Patient given IV fluids and Solu-Medrol IV in the ED. patient also given Toradol 15 mg IV.  Patient will be transferred to [facility] ED for ENT evaluation for drainage of peritonsillar abscess.     Initial Assessment:   23-year-old healthy male presents as transfer for ENT evaluation of peritonsillar abscess.  He presents with normal vital signs.  Airway is patent on arrival.  Differential Diagnosis:   Tonsillitis, peritonsillar abscess, doubt mono, doubt strep pharyngitis  Clinical Tests:   Lab Tests: Reviewed  Radiological Study: Reviewed  Medical Tests: Reviewed  ED Management:  ENT has made the following recommendations.  Discussed return precautions.  Patient is stable for discharge in outpatient follow-up.  - No ENT intervention at this time  - Recommend Clindamycin x10d  - Recommend PO pain medication  - dexamethasone 6 mg PO every other day x 5 doses starting 7/28  Other:   I have discussed this case with another health care provider.       <> Summary of the Discussion: ENT          Attending Attestation:   Physician Attestation Statement for Resident:  As the supervising MD   Physician Attestation Statement: I have personally seen and examined this patient.   I agree with the above history.  -:   As the supervising MD I agree with the above PE.     As the supervising MD I agree with the above treatment, course, plan, and disposition.     I have reviewed the following: records from a referring facility.              ED Course as of 07/27/23 0321   Thu Jul 27, 2023   0049 Discussed with ENT.  They will come evaluate the patient. [KL]      ED Course User Index  [KL] Katerine Laboy MD                 Clinical Impression:    Final diagnoses:  [J36] Peritonsillar abscess (Primary)  [J03.90] Tonsillitis        ED Disposition Condition    Discharge Stable          ED Prescriptions       Medication Sig Dispense Start Date End Date Auth. Provider    clindamycin (CLEOCIN) 150 MG capsule  (Status: Discontinued) Take 2 capsules (300 mg total) by mouth every 6 (six) hours. for 7 days 56 capsule 7/27/2023 7/27/2023 Katerine Laboy MD    dexAMETHasone (DECADRON) 6 MG tablet  (Status: Discontinued) Take 1.5 tablets (9 mg total) by mouth every other day. for 7 doses 11 tablet 7/27/2023 7/27/2023 Katerine Laboy MD    clindamycin (CLEOCIN) 150 MG capsule Take 2 capsules (300 mg total) by mouth every 6 (six) hours. for 10 days 80 capsule 7/27/2023 8/6/2023 Katerine Laboy MD    dexAMETHasone (DECADRON) 6 MG tablet Take 1.5 tablets (9 mg total) by mouth every other day. for 5 doses 8 tablet 7/27/2023 8/5/2023 Katerine Laboy MD          Follow-up Information       Follow up With Specialties Details Why Contact Info    Deniz Gomez MD Pediatrics Schedule an appointment as soon as possible for a visit in 2 days  [address] [place] [address]  [id number]  [facility] [id number]  [phone]      Deniz Gomez MD Pediatrics   [address] [place] [address]  [id number]  [facility] [address]  [phone]               Katerine Laboy MD  Resident  07/27/23 0321       Thao Wilkins MD  07/27/23 9107

## 2023-07-27 NOTE — CONSULTS
Otolaryngology - Head and Neck Surgery  Consultation Report    Consultation From: ED    Chief Complaint: PTA    History of Present Illness: Casey Sen III is a 23 y.o. male who presents with 10 days of sore throat. It is worse on the left side.     ENT consulted for evaluation for peritonsillar abscess.  does  report fever/chills  does report odynophagia, dysphagia  does report muffled voice  does not report trismus  does tolerate secretions  does not have trouble breathing  does not have history of prior PTA  has not received antibiotics for this infection previously  does not history of abnormal bleeding, is not on blood thinners     Review of Systems - Negative except as above    Past Medical History: Patient has a past medical history of Otitis.    Past Surgical History: Patient has no past surgical history on file.    Social History: Patient reports that he has been smoking. He has never used smokeless tobacco. He reports current drug use. Drug: Marijuana. He reports that he does not drink alcohol.    Family History: family history includes No Known Problems in his father and mother.    Medications:    clindamycin  300 mg Oral ED 1 Time       Allergies: Patient has No Known Allergies.    Physical Exam:  Temp:  [98.5 °F (36.9 °C)-100.2 °F (37.9 °C)] 99 °F (37.2 °C)  Pulse:  [66-79] 66  Resp:  [17-19] 19  SpO2:  [97 %-99 %] 97 %  BP: (120-146)/(59-79) 146/79        Constitutional: Well appearing / communicating.  NAD.  Eyes: EOM I Bilaterally  Head/Face: Normocephalic.  Negative paranasal sinus pressure/tenderness.  Salivary glands WNL.  House Brackmann I Bilaterally.  Nose: No gross nasal septal deviation. Inferior Turbinates 2+ bilaterally. No septal perforation. No masses/lesions. External nasal skin without masses/lesions.  Oral Cavity: Gingiva/lips WNL.  FOM Soft, no masses palpated. Oral Tongue mobile. Hard Palate WNL.   Oropharynx: BOT WNL. No masses/lesions noted. Erythema of both tonsillar pillars  without fullness. 1+tonsils b/l, R>L, exudate on the R. Posterior oropharynx WNL.  Soft palate without masses. Midline uvula.   Neck/Lymphatic: No LAD I-VI bilaterally.  No thyromegaly.  No masses noted on exam.  Neuro/Psychiatric: AOx3.  Normal mood and affect.   Cardiovascular: Normal carotid pulses bilaterally, no increasing jugular venous distention noted at cervical region bilaterally.    Respiratory: Normal respiratory effort, no stridor, no retractions noted.        CBC  Recent Labs   Lab 07/26/23  1623   WBC 12.85*   HGB 14.0   HCT 43.5   MCV 88        BMP  Recent Labs   Lab 07/26/23  1623   GLU 98      K 4.2      CO2 27   BUN 12   CREATININE 1.0   CALCIUM 10.1       Imaging Results               CT Soft Tissue Neck With Contrast (Final result)  Result time 07/26/23 17:55:53      Final result by Jhonny Young MD (07/26/23 17:55:53)                   Impression:      Findings suggestive of tonsillitis with small left peritonsillar abscess and questionable subcentimeter right peritonsillar abscess versus phlegmon.    Enlarged cervical chain lymph nodes, likely reactive.    This report was flagged in Epic as abnormal.      Electronically signed by: Jhonny Young MD  Date:    07/26/2023  Time:    17:55               Narrative:    EXAMINATION:  CT SOFT TISSUE NECK WITH CONTRAST    CLINICAL HISTORY:  Tonsil/adenoid disorder;    TECHNIQUE:  CT images obtained throughout the region of the neck after the administration of 75 mL Omnipaque 350 intravenous contrast. Axial, sagittal and coronal reconstructions were performed.    COMPARISON:  CT, 07/21/2023.    FINDINGS:  Prominence of the palatine tonsils and adenoids.  New small hypoattenuating collection in the left peritonsillar region measuring 11 x 7 mm in axial dimension (axial image 60).  Mild striated enhancement pattern of the bilateral tonsils.  Questionable smaller abscess versus phlegmon in the right palatine tonsil (axial image 61  and coronal image 68).    Neck soft tissues are otherwise unremarkable.  No retropharyngeal abscess or effusion.  Epiglottis is normal.  The parotid, submandibular, and thyroid glands are within normal limits.  Multiple enlarged cervical chain lymph nodes measuring up to 12 mm in short axis on the left (axial image 84).  Airway remains patent.    Major vessels of the neck appear patent. Limited intracranial evaluation is within normal limits.  Mucosal retention cyst in the left maxillary sinus.  Minimal mucosal thickening in the paranasal sinuses.  Otherwise, the visualized paranasal sinuses and mastoid air cells are essentially clear.    Lung apices are clear.    Bones demonstrate no aggressive osseous lesion.    Few small dental caries.                                           Assessment: Subcentimeter L PTA and R phlegmon. No evidence parapharyngeal space involvement. Too small for I&D at this time. Tolerating secretions and food. No hx tonsillitis or PTA. BCX, pharyngeal cx and Mono IgG negative. Antibiotic naive.     Plan:   - No ENT intervention at this time  - Recommend Clindamycin x10d  - Recommend PO pain medication  - dexamethasone 6 mg PO every other day x 5 doses starting 7/28  - Return precautions discussed w patient  - Recommend PO trial and if can tolerate PO, okay to discharge home with above oral medication  - Please page with questions        Kristofer Vargas MD  [facility] Otolaryngology, PGY1  07/27/2023 3:04 AM

## 2023-07-27 NOTE — DISCHARGE INSTRUCTIONS
Take the clindamycin as directed.    Take the Decadron every other day for 5 doses.    Follow-up with your primary care doctor in the next 2-3 days for re-evaluation.

## 2023-12-21 ENCOUNTER — HOSPITAL ENCOUNTER (EMERGENCY)
Facility: HOSPITAL | Age: 23
Discharge: HOME | End: 2023-12-21
Attending: STUDENT IN AN ORGANIZED HEALTH CARE EDUCATION/TRAINING PROGRAM
Payer: MEDICAID

## 2023-12-21 VITALS
OXYGEN SATURATION: 99 % | BODY MASS INDEX: 34.86 KG/M2 | SYSTOLIC BLOOD PRESSURE: 118 MMHG | DIASTOLIC BLOOD PRESSURE: 68 MMHG | WEIGHT: 230 LBS | TEMPERATURE: 98 F | RESPIRATION RATE: 18 BRPM | HEART RATE: 60 BPM | HEIGHT: 68 IN

## 2023-12-21 DIAGNOSIS — R10.9 RIGHT FLANK PAIN: ICD-10-CM

## 2023-12-21 DIAGNOSIS — N50.811 PAIN IN RIGHT TESTICLE: Primary | ICD-10-CM

## 2023-12-21 LAB
BACTERIA #/AREA URNS HPF: NORMAL /HPF
BILIRUB UR QL STRIP: NEGATIVE
CLARITY UR: CLEAR
COLOR UR: YELLOW
GLUCOSE UR QL STRIP: NEGATIVE
HGB UR QL STRIP: NEGATIVE
HYALINE CASTS #/AREA URNS LPF: 0 /LPF
KETONES UR QL STRIP: ABNORMAL
LEUKOCYTE ESTERASE UR QL STRIP: NEGATIVE
MICROSCOPIC COMMENT: NORMAL
NITRITE UR QL STRIP: NEGATIVE
PH UR STRIP: 6 [PH] (ref 5–8)
PROT UR QL STRIP: ABNORMAL
RBC #/AREA URNS HPF: 1 /HPF (ref 0–4)
SP GR UR STRIP: >1.03 (ref 1–1.03)
URN SPEC COLLECT METH UR: ABNORMAL
UROBILINOGEN UR STRIP-ACNC: ABNORMAL EU/DL
WBC #/AREA URNS HPF: 2 /HPF (ref 0–5)

## 2023-12-21 PROCEDURE — 81000 URINALYSIS NONAUTO W/SCOPE: CPT

## 2023-12-21 PROCEDURE — 96372 THER/PROPH/DIAG INJ SC/IM: CPT | Performed by: PHYSICIAN ASSISTANT

## 2023-12-21 PROCEDURE — 25000003 PHARM REV CODE 250: Performed by: PHYSICIAN ASSISTANT

## 2023-12-21 PROCEDURE — 87491 CHLMYD TRACH DNA AMP PROBE: CPT | Performed by: PHYSICIAN ASSISTANT

## 2023-12-21 PROCEDURE — 63600175 PHARM REV CODE 636 W HCPCS: Performed by: PHYSICIAN ASSISTANT

## 2023-12-21 PROCEDURE — 99285 EMERGENCY DEPT VISIT HI MDM: CPT | Mod: 25

## 2023-12-21 RX ORDER — ONDANSETRON 4 MG/1
4 TABLET, ORALLY DISINTEGRATING ORAL
Status: COMPLETED | OUTPATIENT
Start: 2023-12-21 | End: 2023-12-21

## 2023-12-21 RX ORDER — POLYETHYLENE GLYCOL 3350 17 G/17G
17 POWDER, FOR SOLUTION ORAL DAILY
Qty: 14 EACH | Refills: 0 | Status: SHIPPED | OUTPATIENT
Start: 2023-12-21 | End: 2023-12-22

## 2023-12-21 RX ORDER — KETOROLAC TROMETHAMINE 30 MG/ML
30 INJECTION, SOLUTION INTRAMUSCULAR; INTRAVENOUS
Status: COMPLETED | OUTPATIENT
Start: 2023-12-21 | End: 2023-12-21

## 2023-12-21 RX ADMIN — ONDANSETRON 4 MG: 4 TABLET, ORALLY DISINTEGRATING ORAL at 09:12

## 2023-12-21 RX ADMIN — KETOROLAC TROMETHAMINE 30 MG: 30 INJECTION, SOLUTION INTRAMUSCULAR; INTRAVENOUS at 09:12

## 2023-12-22 RX ORDER — POLYETHYLENE GLYCOL 3350 17 G/17G
17 POWDER, FOR SOLUTION ORAL DAILY
Qty: 14 EACH | Refills: 0 | Status: SHIPPED | OUTPATIENT
Start: 2023-12-22 | End: 2024-01-05

## 2023-12-22 NOTE — ED PROVIDER NOTES
Encounter Date: 12/21/2023       History     Chief Complaint   Patient presents with    Groin Pain     Pt presents to ER with c/o pain going from the right side of his abdomen down to his right testicle. Pt states his testicle is swollen. Pt denies any strenuous activity. Pt denies any other issues at this time.       23-year-old male presents to ED complaining of right-sided testicular pain, right flank pain, low back pain.    Patient states on Monday he began with atraumatic bilateral low back pain.  Tuesday morning he states he began with nausea, experienced 1 episode of emesis in the morning.  He admits to persistent nausea since then.  He states over the course of the day he began to experience pain to the right flank and right testicle.  Flank pain appears to be worse with certain positions, similarly testicular pain appears to be worse with prolonged sitting.  There is some improvement of his flank pain or testicular pain with changes in position.  He states there is some radiation of the flank pain into the groin, however at other times feels as if the testicular pain is radiating proximally.  Denies any  trauma.  No dysuria, hematuria, strong odor to urine, increased urinary frequency.  No urgency.  No change in strength of stream.  No fever chills myalgias.  No flu-like illness.  Denies recent illness.  No penile discharge.  No  rash.  No suspicion for STI.  Denies history of similar symptoms.  Denies history nephrolithiasis.  No abdominal pain.  Persistent nausea, no emesis.  Poor appetite and intake since onset of symptoms.  There is testicular tenderness, feels as if his right testicle is swollen.  No meds taken prior to arrival.  Normal BMs.  Back pain, flank pain, testicular pain has been constant. No hx hernia.     Denies history of any abdominal surgeries  Denies significant past medical history      Review of patient's allergies indicates:  No Known Allergies  Past Medical History:   Diagnosis  Date    Otitis      History reviewed. No pertinent surgical history.  Family History   Problem Relation Age of Onset    No Known Problems Mother     No Known Problems Father      Social History     Tobacco Use    Smoking status: Former     Types: Cigarettes    Smokeless tobacco: Never    Tobacco comments:     Vaccinations up to date. Patient is in 6th grade.    Substance Use Topics    Alcohol use: No    Drug use: Yes     Types: Marijuana     Review of Systems   Constitutional:  Negative for chills and fever.   Gastrointestinal:  Positive for nausea and vomiting. Negative for abdominal pain.   Genitourinary:  Positive for flank pain and testicular pain. Negative for dysuria, frequency, hematuria, penile discharge, penile pain, penile swelling and scrotal swelling.   Musculoskeletal:  Positive for back pain. Negative for myalgias.   Neurological:  Negative for syncope.       Physical Exam     Initial Vitals   BP Pulse Resp Temp SpO2   12/21/23 1916 12/21/23 1916 12/21/23 2335 12/21/23 1916 12/21/23 1916   137/78 75 18 98 °F (36.7 °C) 99 %      MAP       --                Physical Exam    Nursing note and vitals reviewed.  Constitutional: He appears well-developed and well-nourished. He is not diaphoretic. No distress.   HENT:   Head: Normocephalic and atraumatic.   Neck: Neck supple.   Normal range of motion.  Pulmonary/Chest: No respiratory distress.   Abdominal:   Abdomen overall soft, normal bowel sounds ×4.  No significant tenderness to palpation of any quadrant.  No rebound or guarding.  No palpable mass or distention.  +moderate R flank, mild R CVA ttp. No pain over mcburney's point.    Genitourinary:    Penis normal.   No discharge found.    Genitourinary Comments: No inguinal lymphadenopathy.  No high-riding testicle.  Cremasteric reflex intact bilaterally.  No scrotal edema, erythema, or significant tenderness.  There is moderate tenderness to palpation to the proximal right-sided scrotum, possible bag of  worms sensation to the posterior right testicle, no testicular asymmetry, no relief of pain with elevation of testicle. There is tenderness to the posterior aspect of the right testicle. No obvious inguinal mass or bulge.      Musculoskeletal:      Cervical back: Normal range of motion and neck supple.     Neurological: He is alert and oriented to person, place, and time.   Skin: Skin is warm.   Psychiatric: Thought content normal.         ED Course   Procedures  Labs Reviewed   URINALYSIS, REFLEX TO URINE CULTURE - Abnormal; Notable for the following components:       Result Value    Specific Gravity, UA >1.030 (*)     Protein, UA 1+ (*)     Ketones, UA Trace (*)     Urobilinogen, UA 2.0-3.0 (*)     All other components within normal limits    Narrative:     Specimen Source->Urine   C. TRACHOMATIS/N. GONORRHOEAE BY AMP DNA   URINALYSIS MICROSCOPIC    Narrative:     Specimen Source->Urine          Imaging Results              CT Renal Stone Study ABD Pelvis WO (Final result)  Result time 12/21/23 22:59:45      Final result by Caroline Rainey MD (12/21/23 22:59:45)                   Impression:      No nephrolithiasis or hydronephrosis.      Electronically signed by: Caroline Rainey  Date:    12/21/2023  Time:    22:59               Narrative:    EXAMINATION:  CT RENAL STONE STUDY ABDOMEN PELVIS WITHOUT    CLINICAL HISTORY:  Flank pain    TECHNIQUE:  5 mm unenhanced axial images from the lung bases through the greater trochanters were performed.  Coronal and sagittal reformatted images were provided.    COMPARISON:  02/15/2019    FINDINGS:  Within the limits of a noncontrast examination, the liver, spleen, pancreas, and adrenal glands are unremarkable.  The gallbladder contains no calcified gallstones.    There is no nephrolithiasis or hydronephrosis.    There is no gross abdominal adenopathy or ascites.  A small fat containing umbilical hernia is present.    There are no pelvic masses or adenopathy.  There is  no free pelvic fluid.  The appendix is not inflamed.    At the lung bases, there is 3-4 mm subpleural nodular density at the right middle lobe, which is not new (series 2 axial image 10).    Mild levoscoliosis is present.                                       US Scrotum And Testicles (Final result)  Result time 12/21/23 20:42:32      Final result by Cody Atkins MD (12/21/23 20:42:32)                   Impression:      1. No findings to suggest epididymo-orchitis.  2. Small left varicocele.      Electronically signed by: Cody Atkins MD  Date:    12/21/2023  Time:    20:42               Narrative:    EXAMINATION:  US SCROTUM AND TESTICLES    CLINICAL HISTORY:  Right testicular pain    TECHNIQUE:  Sonography of the scrotum and testes.    COMPARISON:  None.    FINDINGS:  The right testicle measures approximately 4.5 x 2.7 x 2.5 cm with homogeneous echotexture.  Arterial and venous flow is documented to the right testicle without abnormal testicular or epididymal hyperemia.  No right hydrocele.  No right varicocele.    The left testicle measures approximately 4.4 x 3.1 x 3.2 cm with homogeneous echotexture.  Arterial and venous flow is documented to the left testicle without abnormal testicular or epididymal hyperemia.  No left hydrocele.  There is a small left varicocele.                                    X-Rays:   Independently Interpreted Readings:   Abdomen: Personal interpretation:  No convincing evidence of ureterolithiasis or hydronephrosis.  Right-sided stool burden, questionable haziness in the right lower quadrant     Medications   ketorolac injection 30 mg (30 mg Intramuscular Given 12/21/23 2110)   ondansetron disintegrating tablet 4 mg (4 mg Oral Given 12/21/23 2110)     Medical Decision Making  Differential diagnosis:  Epididymitis, orchitis, torsion, constipation, appendicitis, nephrolithiasis    Amount and/or Complexity of Data Reviewed  Labs: ordered.  Radiology: ordered. Decision-making  details documented in ED Course.  Discussion of management or test interpretation with external provider(s): Unclear if worsening pain with straining or valsalva.  CT without obvious inguinal hernia.    Risk  OTC drugs.  Prescription drug management.               ED Course as of 12/22/23 0531   Thu Dec 21, 2023   2128 US Scrotum And Testicles  Scrotal ultrasound without any acute right-sided findings.  No evidence of torsion. [SM]   2319 Feels better on re-evaluation.  No convincing evidence of acute findings on CT.  No appendix inflammation.  On further questioning, he does state he has been having small, firm BMs.  Given right-sided stool burden will treat with MiraLax to see if any improvement of constipation will improve his flank pain.  Discussed need for follow-up with Urology if he continues with testicular pain.  No acute findings on testicular ultrasound which would explain his presentation today.  May be radiation of pain due to constipation?    Regarding this, strongly encouraged to return if he develops fever, vomiting, worsening pain despite NSAID use, worsening symptoms despite normal BMs, if worsening testicular pain or swelling, if any other problems occur. [SM]      ED Course User Index  [SM] Dewayne Akins PA-C                             Clinical Impression:  Final diagnoses:  [N50.811] Pain in right testicle (Primary)  [R10.9] Right flank pain          ED Disposition Condition    Discharge Stable          ED Prescriptions       Medication Sig Dispense Start Date End Date Auth. Provider    polyethylene glycol (GLYCOLAX) 17 gram PwPk  (Status: Discontinued) Take 17 g by mouth once daily. for 14 days 14 each 12/21/2023 12/22/2023 Dewayne Akins PA-C    polyethylene glycol (GLYCOLAX) 17 gram PwPk Take 17 g by mouth once daily. for 14 days 14 each 12/22/2023 1/5/2024 Dewayne Akins PA-C          Follow-up Information    None          Dewayne Akins PA-C  12/22/23 2122

## 2023-12-22 NOTE — DISCHARGE INSTRUCTIONS
Begin taking over-the-counter ibuprofen or Tylenol as needed for pain.  MiraLax daily.  High-fiber diet.  Make sure you are drinking plenty of fluids if you are not eating as much.    Please follow-up and establish care with a local urologist for re-evaluation should you continue with right-sided testicle pain.    Return to this ED if you develop fever, if you begin with abdominal pain, if you develop nausea vomiting, if worsening testicle pain or swelling, if you experience worsening flank or back pain, if any other problems occur.

## 2023-12-22 NOTE — ED TRIAGE NOTES
Pt presents to ED c/o right sided lower abd pain that radiates to right testicles.  Pt also c/o of swelling to the right testicle.  Denies heavy lifting, falls, trauma, penile d/c, urinary symptoms or any other symptoms.

## 2023-12-24 LAB
C TRACH DNA SPEC QL NAA+PROBE: NOT DETECTED
N GONORRHOEA DNA SPEC QL NAA+PROBE: NOT DETECTED

## 2024-04-01 ENCOUNTER — OFFICE VISIT (OUTPATIENT)
Dept: UROLOGY | Facility: CLINIC | Age: 24
End: 2024-04-01
Payer: MEDICAID

## 2024-04-01 DIAGNOSIS — N52.9 ERECTILE DYSFUNCTION, UNSPECIFIED ERECTILE DYSFUNCTION TYPE: ICD-10-CM

## 2024-04-01 PROCEDURE — 99212 OFFICE O/P EST SF 10 MIN: CPT | Mod: PBBFAC | Performed by: STUDENT IN AN ORGANIZED HEALTH CARE EDUCATION/TRAINING PROGRAM

## 2024-04-01 PROCEDURE — 1159F MED LIST DOCD IN RCRD: CPT | Mod: CPTII,,, | Performed by: STUDENT IN AN ORGANIZED HEALTH CARE EDUCATION/TRAINING PROGRAM

## 2024-04-01 PROCEDURE — 99203 OFFICE O/P NEW LOW 30 MIN: CPT | Mod: S$PBB,,, | Performed by: STUDENT IN AN ORGANIZED HEALTH CARE EDUCATION/TRAINING PROGRAM

## 2024-04-01 PROCEDURE — 99999 PR PBB SHADOW E&M-EST. PATIENT-LVL II: CPT | Mod: PBBFAC,,, | Performed by: STUDENT IN AN ORGANIZED HEALTH CARE EDUCATION/TRAINING PROGRAM

## 2024-04-01 RX ORDER — TADALAFIL 5 MG/1
5 TABLET ORAL DAILY PRN
Qty: 30 TABLET | Refills: 1 | Status: SHIPPED | OUTPATIENT
Start: 2024-04-01 | End: 2024-05-28 | Stop reason: SDUPTHER

## 2024-04-01 NOTE — PROGRESS NOTES
" Patient ID: Casey Sen III is a 24 y.o. male.    Chief Complaint: Erectile Dysfunction (Pt states he is having erection issues. States he was taking "sexual vitamins" but does not like the side effects. )    Referral: Dewayne Akins, ASHLI  [address] [place] [address] [place] [id number]     HPI  24 y.o. who presents to the Urology clinic for evaluation of ***. Patient notes symptoms of *** started ***. Patient has *** received prior treatment of this condition which has *** improved their condition.*** history of gross hematuria, UTIs, kidney stones, constipation.  *** drinks *** cups/bottles of water daily, including *** coffee, tea, juice, carbonated beverages. *** pain or discomfort associated with intercourse. *** pelvic procedure in the past.  *** incontinence of urine or feces.     Prior /abdominal surgeries include: ***  Possible medications effecting patient's condition include ***  Anticoagulant usage? Under the care of a cardiologist?  History  DVT, MI, CVA.     Medically Necessary ROS documented in HPI    Past Medical History  Active Ambulatory Problems     Diagnosis Date Noted    Ankle sprain 06/07/2015    Rectal bleeding     Depression 01/07/2022    Pharyngitis 07/21/2023    Peritonsillar abscess 07/26/2023     Resolved Ambulatory Problems     Diagnosis Date Noted    No Resolved Ambulatory Problems     Past Medical History:   Diagnosis Date    Otitis          Past Surgical History  No past surgical history on file.    Social History  Social Connections: Not on file       Medications    Current Outpatient Medications:     ibuprofen (ADVIL,MOTRIN) 800 MG tablet, Take 1 tablet (800 mg total) by mouth every 8 (eight) hours as needed for Pain. (Patient not taking: Reported on 4/1/2024), Disp: 30 tablet, Rfl: 0    Allergies  Review of patient's allergies indicates:  No Known Allergies    Patient's PMH, FH, Social hx, Medications, allergies reviewed and updated as pertinent to today's visit    Objective: "      Physical Exam  Constitutional:       General: He is not in acute distress.     Appearance: He is well-developed. He is not ill-appearing, toxic-appearing or diaphoretic.   HENT:      Head: Normocephalic and atraumatic.      Mouth/Throat:      Mouth: Mucous membranes are moist.   Eyes:      Conjunctiva/sclera: Conjunctivae normal.   Pulmonary:      Effort: Pulmonary effort is normal. No respiratory distress.   Abdominal:      General: There is no distension.      Palpations: Abdomen is soft. There is no mass.      Tenderness: There is no right CVA tenderness or left CVA tenderness.   Musculoskeletal:         General: No swelling or deformity.      Cervical back: Neck supple.   Skin:     Findings: No rash.   Neurological:      Mental Status: He is alert and oriented to person, place, and time.      Gait: Gait normal.   Psychiatric:         Mood and Affect: Mood normal.         Thought Content: Thought content normal.         Judgment: Judgment normal.             Assessment:       1. Erectile dysfunction, unspecified erectile dysfunction type        Plan:       Discussed negative impact of marijuana usage on erectile dysfunction  Trial of cialis 5mg , RTC 8 week

## 2024-04-01 NOTE — PROGRESS NOTES
" Patient ID: Casey Sen III is a 24 y.o. male.    Chief Complaint:   Chief Complaint   Patient presents with    Erectile Dysfunction     Pt states he is having erection issues. States he was taking "sexual vitamins" but does not like the side effects.          Referral: Dewayne Akins, ASHLI  [address] [place] [address] [place] [id number]     HPI  24 y.o. who presents to the Urology clinic for evaluation of ed. Patient notes he has previously tried OTC ED medications. He smokes marijuana QOD, he quit smoking 4 months ago without improvement of his ED so he returned to marijuana use. He works out 4x per week. He has strong DM hx in his family. Denies frequent consumption of sexual material. Noted to have L varicocele in setting of scrotal  pain several months ago, no impact on fertility.   Denies regular alcohol use   Denies smoking tobacco  No penile trauma  Prior work up of scrotal pain did not reveal E/O or acute scrotal pathology.         Medically Necessary ROS documented in HPI    Past Medical History  Active Ambulatory Problems     Diagnosis Date Noted    Ankle sprain 06/07/2015    Rectal bleeding     Depression 01/07/2022    Pharyngitis 07/21/2023    Peritonsillar abscess 07/26/2023     Resolved Ambulatory Problems     Diagnosis Date Noted    No Resolved Ambulatory Problems     Past Medical History:   Diagnosis Date    Otitis          Past Surgical History  No past surgical history on file.    Social History  Social Connections: Not on file       Medications    Current Outpatient Medications:     EScitalopram oxalate (LEXAPRO) 10 MG tablet, Take 1 tablet (10 mg total) by mouth once daily. (Patient taking differently: Take 10 mg by mouth once daily.), Disp: 30 tablet, Rfl: 2    ibuprofen (ADVIL,MOTRIN) 800 MG tablet, Take 1 tablet (800 mg total) by mouth every 8 (eight) hours as needed for Pain. (Patient not taking: Reported on 4/1/2024), Disp: 30 tablet, Rfl: 0    Allergies  Review of patient's allergies " indicates:  No Known Allergies    Patient's PMH, FH, Social hx, Medications, allergies reviewed and updated as pertinent to today's visit    Objective:      Physical Exam  Constitutional:       General: He is not in acute distress.     Appearance: He is well-developed. He is not ill-appearing, toxic-appearing or diaphoretic.   HENT:      Head: Normocephalic and atraumatic.      Mouth/Throat:      Mouth: Mucous membranes are moist.   Eyes:      Conjunctiva/sclera: Conjunctivae normal.   Pulmonary:      Effort: Pulmonary effort is normal. No respiratory distress.   Abdominal:      General: Abdomen is flat. There is no distension.      Palpations: There is no mass.   Musculoskeletal:         General: No swelling or deformity.      Cervical back: Neck supple.   Skin:     Findings: No rash.   Neurological:      Mental Status: He is alert and oriented to person, place, and time.      Gait: Gait normal.   Psychiatric:         Mood and Affect: Mood normal.         Thought Content: Thought content normal.         Judgment: Judgment normal.           Assessment:       1. Erectile dysfunction, unspecified erectile dysfunction type        Plan:         ED  Discussed negative impact of marijuana on erections, smoking cessation encouraged  Encouraged cardiovascular fitness  Patient eager to try cialis/viagra to assist with erections. Rx for 5mg PRN tadalafil provided  Discussed risk of priapism/prolonged erections requiring ED visit for blood aspiration from penis  Patient VU

## 2024-05-17 ENCOUNTER — TELEPHONE (OUTPATIENT)
Dept: UROLOGY | Facility: CLINIC | Age: 24
End: 2024-05-17
Payer: MEDICAID

## 2024-05-28 DIAGNOSIS — N52.9 ERECTILE DYSFUNCTION, UNSPECIFIED ERECTILE DYSFUNCTION TYPE: ICD-10-CM

## 2024-05-28 RX ORDER — TADALAFIL 5 MG/1
5 TABLET ORAL DAILY PRN
Qty: 30 TABLET | Refills: 1 | Status: SHIPPED | OUTPATIENT
Start: 2024-05-28 | End: 2025-05-28

## 2024-05-28 NOTE — TELEPHONE ENCOUNTER
----- Message from Wil Caputo sent at 5/28/2024 10:05 AM CDT -----  Regarding: Patient advice  Contact: Pt  Pt called in regards to getting an medication refill     tadalafiL (CIALIS) 5 MG tablet 30 tablet 1 4/1/2024 4/1/2025 No  Sig - Route: Take 1 tablet (5 mg total) by mouth daily as needed for Erectile Dysfunction. - Oral  Sent to pharmacy as: tadalafiL (CIALIS) 5 MG tablet  Class: Normal  Order: [id number]  Date/Time Signed: 4/1/2024 10:15      E-Prescribing Status: Receipt confirmed by pharmacy (4/1/2024 10:15 AM CDT)    [facility]   [address] [place] [address] [place] [id number] [phone]

## 2024-06-10 ENCOUNTER — TELEPHONE (OUTPATIENT)
Dept: UROLOGY | Facility: CLINIC | Age: 24
End: 2024-06-10
Payer: MEDICAID

## 2024-06-10 NOTE — TELEPHONE ENCOUNTER
----- Message from Oscar Osorio MA sent at 6/7/2024 10:33 AM CDT -----  Type: Patient Call Back    Who called: Self    What is the request in detail: pt. Is asking for a call with an update on his refill request please ..     Can the clinic reply by [facility]?NO    Would the patient rather a call back or a response via [facility]? Yes, call    Best call back number: [phone]

## 2024-07-01 ENCOUNTER — TELEPHONE (OUTPATIENT)
Dept: UROLOGY | Facility: CLINIC | Age: 24
End: 2024-07-01
Payer: MEDICAID

## 2024-07-01 NOTE — TELEPHONE ENCOUNTER
Pt contacted pt informed provider would like to see him I office to discuss medication adjustment . Appt scheduled.  ----- Message from Celso Gregory MD sent at 7/1/2024  3:52 PM CDT -----  Regarding: RE: SELF [phone]  Needs follow up appt to discuss medication adjustment  ----- Message -----  From: Kevin Hilliard MA  Sent: 7/1/2024   3:46 PM CDT  To: Celso Gregory MD  Subject: FW: SELF [phone]                              ----- Message -----  From: Shira Evans  Sent: 7/1/2024  10:16 AM CDT  To: Bri Houston Staff  Subject: SELF [phone]                                Type: Patient Call Back     Who called:SELF     What is the request in detail:pt called to see about getting tadalafiL (CIALIS) 5 MG tablet dosage increased to 10 MG if possible     Can the clinic reply by [facility]?-Yes     Would the patient rather a call back or a response via [facility]?-prefers a call back     Best call back number: [phone]     Additional Information: PT also wanted to ask if there is a higher dosage than 10 mgs

## 2024-07-03 ENCOUNTER — OFFICE VISIT (OUTPATIENT)
Dept: UROLOGY | Facility: CLINIC | Age: 24
End: 2024-07-03
Payer: MEDICAID

## 2024-07-03 VITALS — BODY MASS INDEX: 33.22 KG/M2 | WEIGHT: 218.5 LBS

## 2024-07-03 DIAGNOSIS — N52.9 ERECTILE DYSFUNCTION, UNSPECIFIED ERECTILE DYSFUNCTION TYPE: ICD-10-CM

## 2024-07-03 PROCEDURE — 99213 OFFICE O/P EST LOW 20 MIN: CPT | Mod: S$PBB,,, | Performed by: STUDENT IN AN ORGANIZED HEALTH CARE EDUCATION/TRAINING PROGRAM

## 2024-07-03 PROCEDURE — 3008F BODY MASS INDEX DOCD: CPT | Mod: CPTII,,, | Performed by: STUDENT IN AN ORGANIZED HEALTH CARE EDUCATION/TRAINING PROGRAM

## 2024-07-03 PROCEDURE — 99999 PR PBB SHADOW E&M-EST. PATIENT-LVL II: CPT | Mod: PBBFAC,,, | Performed by: STUDENT IN AN ORGANIZED HEALTH CARE EDUCATION/TRAINING PROGRAM

## 2024-07-03 PROCEDURE — 1160F RVW MEDS BY RX/DR IN RCRD: CPT | Mod: CPTII,,, | Performed by: STUDENT IN AN ORGANIZED HEALTH CARE EDUCATION/TRAINING PROGRAM

## 2024-07-03 PROCEDURE — 99212 OFFICE O/P EST SF 10 MIN: CPT | Mod: PBBFAC | Performed by: STUDENT IN AN ORGANIZED HEALTH CARE EDUCATION/TRAINING PROGRAM

## 2024-07-03 PROCEDURE — 1159F MED LIST DOCD IN RCRD: CPT | Mod: CPTII,,, | Performed by: STUDENT IN AN ORGANIZED HEALTH CARE EDUCATION/TRAINING PROGRAM

## 2024-07-03 RX ORDER — TADALAFIL 10 MG/1
10 TABLET ORAL DAILY PRN
Qty: 30 TABLET | Refills: 6 | Status: SHIPPED | OUTPATIENT
Start: 2024-07-03 | End: 2025-07-03

## 2024-07-03 NOTE — PROGRESS NOTES
Patient ID: Casey Sen III is a 24 y.o. male.    Chief Complaint: Follow-up (Pt states he would like a increase in dosage. States he has to take 3-4 pills to help,)    Referral: Self, Aaareferral  No address on file     HPI  24 y.o. who presents to the Urology clinic for evaluation of ED. He was started on Cialis 5mg, notes having to take several pills to obtain an erection. Denies chest pain, prolonged erections. Notes headaches with 15mg dose.       Medically Necessary ROS documented in HPI    Past Medical History  Active Ambulatory Problems     Diagnosis Date Noted    Ankle sprain 06/07/2015    Rectal bleeding     Depression 01/07/2022    Pharyngitis 07/21/2023    Peritonsillar abscess 07/26/2023     Resolved Ambulatory Problems     Diagnosis Date Noted    No Resolved Ambulatory Problems     Past Medical History:   Diagnosis Date    Otitis          Past Surgical History  No past surgical history on file.    Social History       Medications    Current Outpatient Medications:     tadalafiL (CIALIS) 5 MG tablet, Take 1 tablet (5 mg total) by mouth daily as needed for Erectile Dysfunction., Disp: 30 tablet, Rfl: 1    ibuprofen (ADVIL,MOTRIN) 800 MG tablet, Take 1 tablet (800 mg total) by mouth every 8 (eight) hours as needed for Pain. (Patient not taking: Reported on 4/1/2024), Disp: 30 tablet, Rfl: 0    Allergies  Review of patient's allergies indicates:  No Known Allergies    Patient's PMH, FH, Social hx, Medications, allergies reviewed and updated as pertinent to today's visit    Objective:      Physical Exam  Constitutional:       General: He is not in acute distress.     Appearance: He is well-developed. He is not ill-appearing, toxic-appearing or diaphoretic.   HENT:      Head: Normocephalic and atraumatic.      Mouth/Throat:      Mouth: Mucous membranes are moist.   Eyes:      Conjunctiva/sclera: Conjunctivae normal.   Pulmonary:      Effort: Pulmonary effort is normal. No respiratory distress.    Abdominal:      General: Abdomen is flat. There is no distension.      Palpations: Abdomen is soft. There is no mass.      Tenderness: There is no right CVA tenderness or left CVA tenderness.   Musculoskeletal:         General: No swelling or deformity.      Cervical back: Neck supple.   Skin:     Findings: No rash.   Neurological:      Mental Status: He is alert and oriented to person, place, and time.      Gait: Gait normal.   Psychiatric:         Mood and Affect: Mood normal.         Thought Content: Thought content normal.         Judgment: Judgment normal.             Assessment:       1. Erectile dysfunction, unspecified erectile dysfunction type        Plan:         Increase tadalafil from 5mg to 10mg  Discussed risk of priapism   RTC 6 months or sooner if needed  Discussed importance of taking medication as prescribed before increasing dose outside the guidance of a physician to minimize risk of side effects    RTC 6 months

## 2024-09-28 ENCOUNTER — HOSPITAL ENCOUNTER (EMERGENCY)
Facility: HOSPITAL | Age: 24
Discharge: HOME | End: 2024-09-28
Attending: EMERGENCY MEDICINE
Payer: MEDICAID

## 2024-09-28 VITALS
HEART RATE: 106 BPM | HEIGHT: 69 IN | TEMPERATURE: 99 F | OXYGEN SATURATION: 98 % | BODY MASS INDEX: 33.18 KG/M2 | SYSTOLIC BLOOD PRESSURE: 145 MMHG | WEIGHT: 224 LBS | RESPIRATION RATE: 18 BRPM | DIASTOLIC BLOOD PRESSURE: 88 MMHG

## 2024-09-28 DIAGNOSIS — B34.9 VIRAL SYNDROME: Primary | ICD-10-CM

## 2024-09-28 DIAGNOSIS — F41.9 ANXIETY: ICD-10-CM

## 2024-09-28 DIAGNOSIS — R05.9 COUGH: ICD-10-CM

## 2024-09-28 LAB
CTP QC/QA: YES
CTP QC/QA: YES
MOLECULAR STREP A: NEGATIVE
SARS-COV-2 RDRP RESP QL NAA+PROBE: NEGATIVE

## 2024-09-28 PROCEDURE — 87635 SARS-COV-2 COVID-19 AMP PRB: CPT

## 2024-09-28 PROCEDURE — 87651 STREP A DNA AMP PROBE: CPT

## 2024-09-28 PROCEDURE — 99284 EMERGENCY DEPT VISIT MOD MDM: CPT | Mod: 25

## 2024-09-28 RX ORDER — GUAIFENESIN 100 MG/5ML
200 SOLUTION ORAL EVERY 4 HOURS PRN
Qty: 118 ML | Refills: 0 | Status: SHIPPED | OUTPATIENT
Start: 2024-09-28 | End: 2024-10-08

## 2024-09-28 RX ORDER — PHENOL 1.4 %
AEROSOL, SPRAY (ML) MUCOUS MEMBRANE
Qty: 177 ML | Refills: 0 | Status: SHIPPED | OUTPATIENT
Start: 2024-09-28

## 2024-09-28 RX ORDER — BENZONATATE 100 MG/1
100 CAPSULE ORAL 3 TIMES DAILY PRN
Qty: 20 CAPSULE | Refills: 0 | Status: SHIPPED | OUTPATIENT
Start: 2024-09-28 | End: 2024-10-08

## 2024-09-28 RX ORDER — PROMETHAZINE HYDROCHLORIDE 25 MG/1
25 TABLET ORAL EVERY 6 HOURS PRN
Qty: 15 TABLET | Refills: 0 | Status: SHIPPED | OUTPATIENT
Start: 2024-09-28

## 2024-09-28 NOTE — DISCHARGE INSTRUCTIONS

## 2024-09-28 NOTE — ED PROVIDER NOTES
Encounter Date: 9/28/2024       History     Chief Complaint   Patient presents with    Cough     Pt to ER with reports of cough x 3 weeks. Pt seen at PCP and said medication isnt working. Pt also reports increased anxiety and wants something stronger prescribed to him.      24-year-old male with past medical history of anxiety, depression, PTSD presents to ED for emergent evaluation of 2 week history of productive green/brown cough and sore throat.  He denies any dysphagia, hemoptysis, fever, chills, chest pain, shortness of breath, abdominal pain, nausea, vomiting, diarrhea, dysuria, hematuria.  Patient is requesting a referral for Psychiatry.  He denies any suicidal or homicidal ideations.  He states that he was seeing someone at [facility]; however, he states that the medications that they are giving him are not helping him.  No other symptoms reported.    The history is provided by the patient. No  was used.     Review of patient's allergies indicates:  No Known Allergies  Past Medical History:   Diagnosis Date    Otitis      History reviewed. No pertinent surgical history.  Family History   Problem Relation Name Age of Onset    No Known Problems Mother      No Known Problems Father       Social History     Tobacco Use    Smoking status: Former     Types: Cigarettes    Smokeless tobacco: Never    Tobacco comments:     Vaccinations up to date. Patient is in 6th grade.    Substance Use Topics    Alcohol use: No    Drug use: Yes     Types: Marijuana     Review of Systems   Constitutional:  Negative for chills and fever.   HENT:  Positive for sore throat. Negative for congestion, ear pain, rhinorrhea and trouble swallowing.    Eyes:  Negative for redness.   Respiratory:  Positive for cough. Negative for shortness of breath.         (-) hemoptysis   Cardiovascular:  Negative for chest pain.   Gastrointestinal:  Negative for abdominal pain, diarrhea, nausea and vomiting.    Genitourinary:  Negative for decreased urine volume, difficulty urinating, dysuria, frequency, hematuria and urgency.   Musculoskeletal:  Negative for back pain and neck pain.   Skin:  Negative for rash.   Neurological:  Negative for headaches.   Psychiatric/Behavioral:  Negative for confusion, self-injury and suicidal ideas (or homicidal).        Physical Exam     Initial Vitals [09/28/24 1654]   BP Pulse Resp Temp SpO2   (!) 145/88 106 18 98.6 °F (37 °C) 98 %      MAP       --         Physical Exam    Nursing note and vitals reviewed.  Constitutional: He appears well-developed and well-nourished. He is not diaphoretic.  Non-toxic appearance. No distress.   HENT:   Head: Normocephalic and atraumatic.   Right Ear: Hearing, tympanic membrane, external ear and ear canal normal. Tympanic membrane is not perforated, not erythematous and not bulging.   Left Ear: Hearing, tympanic membrane, external ear and ear canal normal. Tympanic membrane is not perforated, not erythematous and not bulging.   Nose: Nose normal. Mouth/Throat: Uvula is midline and oropharynx is clear and moist.   Full range motion of jaw.  No trismus.  Airway intact.  Speaking in full sentences.  No hot potato voice.  Tolerating secretions.   Eyes: Conjunctivae and EOM are normal.   Neck: Neck supple.   Normal range of motion.   Full passive range of motion without pain.     Cardiovascular:            Pulses:       Radial pulses are 2+ on the right side and 2+ on the left side.   Pulmonary/Chest: Effort normal and breath sounds normal. No respiratory distress. He has no decreased breath sounds.   Abdominal: Abdomen is soft and flat. There is no abdominal tenderness.   No right CVA tenderness.  No left CVA tenderness. There is no rebound and no guarding.   Musculoskeletal:      Cervical back: Full passive range of motion without pain, normal range of motion and neck supple. No rigidity.     Neurological: He is alert. No cranial nerve deficit.   Neuro  intact.  Strength and sensation intact to bilateral upper and lower extremities.   Skin: Skin is warm and dry. No rash noted.         ED Course   Procedures  Labs Reviewed   SARS-COV-2 RDRP GENE       Result Value    POC Rapid COVID Negative       Acceptable Yes     POCT STREP A MOLECULAR    Molecular Strep A, POC Negative       Acceptable Yes            Imaging Results              X-Ray Chest PA And Lateral (Final result)  Result time 09/28/24 17:54:36      Final result by Cody Atkins MD (09/28/24 17:54:36)                   Impression:      1. Interstitial findings are accentuated by habitus, correlation is needed to exclude early change of viral airways process or reactive airways process.      Electronically signed by: Cody Atkins MD  Date:    09/28/2024  Time:    17:54               Narrative:    EXAMINATION:  XR CHEST PA AND LATERAL    CLINICAL HISTORY:  Cough, unspecified    TECHNIQUE:  PA and lateral views of the chest were performed.    COMPARISON:  02/15/2019    FINDINGS:  The cardiomediastinal silhouette is not enlarged.  There is no pleural effusion.  The trachea is midline.  The lungs are symmetrically expanded bilaterally with mildly coarse interstitial attenuation, accentuated by habitus..  No large focal consolidation seen.  There is no pneumothorax.  The osseous structures are unremarkable.                                       Medications - No data to display  Medical Decision Making  This is a 24-year-old male with past medical history of anxiety, depression, PTSD presents to ED for emergent evaluation of 2 week history of productive green/brown cough and sore throat.  He denies any dysphagia, hemoptysis, fever, chills, chest pain, shortness of breath, abdominal pain, nausea, vomiting, diarrhea, dysuria, hematuria.  Patient is requesting a referral for Psychiatry.  He denies any suicidal or homicidal ideations.  He states that he was seeing someone at  [facility]; however, he states that the medications that they are giving him are not helping him.  No other symptoms reported.    On physical exam, patient is well-appearing and in no acute distress.  Nontoxic appearing.  Lungs are clear to auscultation bilaterally.  Abdomen is soft and nontender.  No guarding, rigidity, rebound.  2+ radial pulses bilaterally.  Posterior oropharynx is not erythematous.  No edema or exudate.  Uvula midline.  Bilateral tympanic membrane is normal.  No erythema, bulging, or perforations.  Neuro intact.  Strength and sensation intact bilateral upper and lower extremities.  Full range motion of neck.  No neck rigidity.  Full range motion of jaw.  No trismus.  Airway intact.  Speaking in full sentences.  No hot potato voice.  Tolerating secretions.  COVID and flu negative.  Chest x-ray revealed: 1. Interstitial findings are accentuated by habitus, correlation is needed to exclude early change of viral airways process or reactive airways process.  Will discharge patient on Tessalon Perles, Robitussin, promethazine DM, Chloraseptic throat spray, Cepacol lozenges.  Ambulatory referral to Psychiatry ordered.  Urged prompt follow-up with psychiatry and PCP for further evaluation.    Strict return precautions given. I discussed with the patient/family the diagnosis, treatment plan, indications for return to the emergency department, and for expected follow-up. The patient/family verbalized an understanding. The patient/family is asked if there are any questions or concerns. We discuss the case, until all issues are addressed to the patient/family's satisfaction. Patient/family understands and is agreeable to the plan. Patient is stable and ready for discharge.      Amount and/or Complexity of Data Reviewed  Labs: ordered.  Radiology: ordered.                                      Clinical Impression:  Final diagnoses:  [R05.9] Cough  [B34.9] Viral syndrome (Primary)  [F41.9]  Anxiety          ED Disposition Condition    Discharge Stable          ED Prescriptions       Medication Sig Dispense Start Date End Date Auth. Provider    guaiFENesin 100 mg/5 ml (ROBITUSSIN) 100 mg/5 mL syrup Take 10 mLs (200 mg total) by mouth every 4 (four) hours as needed for Cough. 118 mL 9/28/2024 10/8/2024 Bonnie Morel PA-C    benzonatate (TESSALON) 100 MG capsule Take 1 capsule (100 mg total) by mouth 3 (three) times daily as needed for Cough. 20 capsule 9/28/2024 10/8/2024 Bonnie Morel PA-C    promethazine (PHENERGAN) 25 MG tablet Take 1 tablet (25 mg total) by mouth every 6 (six) hours as needed for Nausea. 15 tablet 9/28/2024 -- Bonnie Morel PA-C    phenoL (CHLORASEPTIC THROAT SPRAY) 1.4 % SprA by Mucous Membrane route every 2 (two) hours. 177 mL 9/28/2024 -- Bonnie Morel PA-C    benzocaine-menthoL 15-3.6 mg Lozg 1 lozenge by Mucous Membrane route every 2 (two) hours as needed (sore throat). 16 lozenge 9/28/2024 -- Bonnie Morel PA-C          Follow-up Information       Follow up With Specialties Details Why Contact Info    Deniz Gomez MD Pediatrics In 2 days for further evaluation [address] [place] [address]  [id number]  [facility] [address]  [phone]      [facility] Emergency Medicine In 2 days If symptoms worsen [address] [facility] [place] [address] [phone]             Bonnie Morel PA-C  09/28/24 2611

## 2024-09-28 NOTE — ED TRIAGE NOTES
Patient presents to ED with complaints of cough x3 weeks, pt states red/brown mucous produced x2 since he has had cough.

## 2024-11-08 ENCOUNTER — HOSPITAL ENCOUNTER (EMERGENCY)
Facility: HOSPITAL | Age: 24
Discharge: HOME | End: 2024-11-08
Attending: STUDENT IN AN ORGANIZED HEALTH CARE EDUCATION/TRAINING PROGRAM
Payer: MEDICAID

## 2024-11-08 VITALS
HEART RATE: 81 BPM | SYSTOLIC BLOOD PRESSURE: 158 MMHG | TEMPERATURE: 99 F | RESPIRATION RATE: 20 BRPM | DIASTOLIC BLOOD PRESSURE: 92 MMHG | WEIGHT: 224 LBS | BODY MASS INDEX: 33.18 KG/M2 | OXYGEN SATURATION: 96 % | HEIGHT: 69 IN

## 2024-11-08 DIAGNOSIS — R41.82 AMS (ALTERED MENTAL STATUS): ICD-10-CM

## 2024-11-08 DIAGNOSIS — F41.9 ANXIETY: Primary | ICD-10-CM

## 2024-11-08 LAB
ALBUMIN SERPL BCP-MCNC: 4.3 G/DL (ref 3.5–5.2)
ALLENS TEST: NORMAL
ALP SERPL-CCNC: 64 U/L (ref 40–150)
ALT SERPL W/O P-5'-P-CCNC: 18 U/L (ref 10–44)
AMPHET+METHAMPHET UR QL: NEGATIVE
ANION GAP SERPL CALC-SCNC: 13 MMOL/L (ref 8–16)
AST SERPL-CCNC: 21 U/L (ref 10–40)
BARBITURATES UR QL SCN>200 NG/ML: NEGATIVE
BASOPHILS # BLD AUTO: 0.02 K/UL (ref 0–0.2)
BASOPHILS NFR BLD: 0.2 % (ref 0–1.9)
BENZODIAZ UR QL SCN>200 NG/ML: ABNORMAL
BILIRUB SERPL-MCNC: 1.2 MG/DL (ref 0.1–1)
BILIRUB UR QL STRIP: NEGATIVE
BNP SERPL-MCNC: <10 PG/ML (ref 0–99)
BUN SERPL-MCNC: 15 MG/DL (ref 6–20)
BZE UR QL SCN: NEGATIVE
CALCIUM SERPL-MCNC: 9.8 MG/DL (ref 8.7–10.5)
CANNABINOIDS UR QL SCN: ABNORMAL
CHLORIDE SERPL-SCNC: 105 MMOL/L (ref 95–110)
CK SERPL-CCNC: 496 U/L (ref 20–200)
CLARITY UR: CLEAR
CO2 SERPL-SCNC: 23 MMOL/L (ref 23–29)
COLOR UR: YELLOW
CREAT SERPL-MCNC: 1.2 MG/DL (ref 0.5–1.4)
CREAT UR-MCNC: >450 MG/DL (ref 23–375)
DIFFERENTIAL METHOD BLD: ABNORMAL
EOSINOPHIL # BLD AUTO: 0.1 K/UL (ref 0–0.5)
EOSINOPHIL NFR BLD: 0.7 % (ref 0–8)
ERYTHROCYTE [DISTWIDTH] IN BLOOD BY AUTOMATED COUNT: 13.1 % (ref 11.5–14.5)
EST. GFR  (NO RACE VARIABLE): >60 ML/MIN/1.73 M^2
GLUCOSE SERPL-MCNC: 102 MG/DL (ref 70–110)
GLUCOSE UR QL STRIP: NEGATIVE
HCT VFR BLD AUTO: 45.7 % (ref 40–54)
HGB BLD-MCNC: 14.2 G/DL (ref 14–18)
HGB UR QL STRIP: ABNORMAL
IMM GRANULOCYTES # BLD AUTO: 0.06 K/UL (ref 0–0.04)
IMM GRANULOCYTES NFR BLD AUTO: 0.7 % (ref 0–0.5)
KETONES UR QL STRIP: ABNORMAL
LDH SERPL L TO P-CCNC: 0.9 MMOL/L (ref 0.5–2.2)
LEUKOCYTE ESTERASE UR QL STRIP: ABNORMAL
LYMPHOCYTES # BLD AUTO: 1.5 K/UL (ref 1–4.8)
LYMPHOCYTES NFR BLD: 18.1 % (ref 18–48)
MAGNESIUM SERPL-MCNC: 2 MG/DL (ref 1.6–2.6)
MCH RBC QN AUTO: 28.5 PG (ref 27–31)
MCHC RBC AUTO-ENTMCNC: 31.1 G/DL (ref 32–36)
MCV RBC AUTO: 92 FL (ref 82–98)
METHADONE UR QL SCN>300 NG/ML: NEGATIVE
MICROSCOPIC COMMENT: ABNORMAL
MONOCYTES # BLD AUTO: 0.4 K/UL (ref 0.3–1)
MONOCYTES NFR BLD: 5.1 % (ref 4–15)
NEUTROPHILS # BLD AUTO: 6.1 K/UL (ref 1.8–7.7)
NEUTROPHILS NFR BLD: 75.2 % (ref 38–73)
NITRITE UR QL STRIP: NEGATIVE
NRBC BLD-RTO: 0 /100 WBC
OPIATES UR QL SCN: NEGATIVE
PCP UR QL SCN>25 NG/ML: NEGATIVE
PH UR STRIP: 6 [PH] (ref 5–8)
PLATELET # BLD AUTO: 295 K/UL (ref 150–450)
PMV BLD AUTO: 9.2 FL (ref 9.2–12.9)
POCT GLUCOSE: 114 MG/DL (ref 70–110)
POTASSIUM SERPL-SCNC: 4.2 MMOL/L (ref 3.5–5.1)
PROT SERPL-MCNC: 7.7 G/DL (ref 6–8.4)
PROT UR QL STRIP: ABNORMAL
RBC # BLD AUTO: 4.99 M/UL (ref 4.6–6.2)
RBC #/AREA URNS HPF: 10 /HPF (ref 0–4)
SAMPLE: NORMAL
SITE: NORMAL
SODIUM SERPL-SCNC: 141 MMOL/L (ref 136–145)
SP GR UR STRIP: >1.03 (ref 1–1.03)
TOXICOLOGY INFORMATION: ABNORMAL
TROPONIN I SERPL DL<=0.01 NG/ML-MCNC: <0.006 NG/ML (ref 0–0.03)
TSH SERPL DL<=0.005 MIU/L-ACNC: 0.81 UIU/ML (ref 0.4–4)
URN SPEC COLLECT METH UR: ABNORMAL
UROBILINOGEN UR STRIP-ACNC: NEGATIVE EU/DL
WBC # BLD AUTO: 8.07 K/UL (ref 3.9–12.7)
WBC #/AREA URNS HPF: 5 /HPF (ref 0–5)

## 2024-11-08 PROCEDURE — 25000003 PHARM REV CODE 250: Performed by: STUDENT IN AN ORGANIZED HEALTH CARE EDUCATION/TRAINING PROGRAM

## 2024-11-08 PROCEDURE — 83880 ASSAY OF NATRIURETIC PEPTIDE: CPT | Performed by: STUDENT IN AN ORGANIZED HEALTH CARE EDUCATION/TRAINING PROGRAM

## 2024-11-08 PROCEDURE — 93005 ELECTROCARDIOGRAM TRACING: CPT

## 2024-11-08 PROCEDURE — 93010 ELECTROCARDIOGRAM REPORT: CPT | Mod: ,,, | Performed by: INTERNAL MEDICINE

## 2024-11-08 PROCEDURE — 99285 EMERGENCY DEPT VISIT HI MDM: CPT | Mod: 25

## 2024-11-08 PROCEDURE — 81000 URINALYSIS NONAUTO W/SCOPE: CPT | Mod: 59 | Performed by: STUDENT IN AN ORGANIZED HEALTH CARE EDUCATION/TRAINING PROGRAM

## 2024-11-08 PROCEDURE — 84484 ASSAY OF TROPONIN QUANT: CPT | Performed by: STUDENT IN AN ORGANIZED HEALTH CARE EDUCATION/TRAINING PROGRAM

## 2024-11-08 PROCEDURE — 84443 ASSAY THYROID STIM HORMONE: CPT | Performed by: STUDENT IN AN ORGANIZED HEALTH CARE EDUCATION/TRAINING PROGRAM

## 2024-11-08 PROCEDURE — 82962 GLUCOSE BLOOD TEST: CPT

## 2024-11-08 PROCEDURE — 83605 ASSAY OF LACTIC ACID: CPT

## 2024-11-08 PROCEDURE — 85025 COMPLETE CBC W/AUTO DIFF WBC: CPT | Performed by: STUDENT IN AN ORGANIZED HEALTH CARE EDUCATION/TRAINING PROGRAM

## 2024-11-08 PROCEDURE — 80053 COMPREHEN METABOLIC PANEL: CPT | Performed by: STUDENT IN AN ORGANIZED HEALTH CARE EDUCATION/TRAINING PROGRAM

## 2024-11-08 PROCEDURE — 82550 ASSAY OF CK (CPK): CPT | Performed by: STUDENT IN AN ORGANIZED HEALTH CARE EDUCATION/TRAINING PROGRAM

## 2024-11-08 PROCEDURE — 99900035 HC TECH TIME PER 15 MIN (STAT)

## 2024-11-08 PROCEDURE — 83735 ASSAY OF MAGNESIUM: CPT | Performed by: STUDENT IN AN ORGANIZED HEALTH CARE EDUCATION/TRAINING PROGRAM

## 2024-11-08 PROCEDURE — 80307 DRUG TEST PRSMV CHEM ANLYZR: CPT | Performed by: STUDENT IN AN ORGANIZED HEALTH CARE EDUCATION/TRAINING PROGRAM

## 2024-11-08 RX ORDER — PROPRANOLOL HYDROCHLORIDE 10 MG/1
10 TABLET ORAL 2 TIMES DAILY
COMMUNITY

## 2024-11-08 RX ORDER — FLUOXETINE 10 MG/1
10 TABLET ORAL DAILY
COMMUNITY

## 2024-11-08 RX ORDER — RAMELTEON 8 MG/1
8 TABLET ORAL NIGHTLY
COMMUNITY

## 2024-11-08 RX ORDER — BUPROPION HYDROCHLORIDE 150 MG/1
150 TABLET, EXTENDED RELEASE ORAL DAILY
COMMUNITY

## 2024-11-08 RX ORDER — TRAZODONE HYDROCHLORIDE 150 MG/1
150 TABLET ORAL NIGHTLY
COMMUNITY

## 2024-11-08 RX ORDER — DOCUSATE SODIUM 100 MG
750 CAPSULE ORAL ONCE
Status: COMPLETED | OUTPATIENT
Start: 2024-11-08 | End: 2024-11-08

## 2024-11-08 RX ADMIN — Medication 750 ML: at 04:11

## 2024-11-08 NOTE — Clinical Note
"Casey Kee"Mckinley was seen and treated in our emergency department on 11/8/2024.  He may return to work on 11/11/2024.       If you have any questions or concerns, please don't hesitate to call.      Bibi De La O MD"

## 2024-11-08 NOTE — ED TRIAGE NOTES
"Pt to ED c/o "passing out". Pt was in custody at , he reports that he kept asking for a nurse because he didn't feel well but he states that they kept telling him that they didn't have a nurse, so he went to lay back down on the bunk when the cell room mate asked him if he was okay and then that all he remembers and states "I passed out", . Pt was release from alf (no longer in custody) and brought here via EMS.   "

## 2024-11-08 NOTE — DISCHARGE INSTRUCTIONS

## 2024-11-08 NOTE — ED PROVIDER NOTES
Encounter Date: 11/8/2024    SCRIBE #1 NOTE: IParker, am scribing for, and in the presence of,  Bibi De La O MD.   SCRIBE #2 NOTE: I, Teenarich Buchanan, am scribing for, and in the presence of,  Bibi De La O MD. I have scribed the remaining portions of the note not scribed by Scribe #1.     History     Chief Complaint   Patient presents with    Anxiety     Pt BIB EMS, c/o anxiety. Pt was being released from intermediate, had an anxiety attack, and fell to the ground. Pt c/o head pain. Pt denies LOC, neck, or back pain.      24 years old male with PMHx of anxiety, depression, prediabetes presents to ED via EMS for headache onset earlier today. Patient reports that he has been in intermediate since yesterday. This AM while getting released from intermediate, that he started feeling anxious with associated SOB, dizziness causing him to fall. Patient states that he fell to his right side but unable to recall the episode with more details. Here, he complains of right sided headache spreading to right eyes, vision changes, upper back pain and bilateral feet pain. Denies prior episodes of seizures. Denies tongue biting or bowel/bladder incontinence. Last food intake was yesterday, none today. Occasionally smokes marijuana and drinks beers. Denies abdominal pain, nausea, vomiting, chest pain, SI, HI, AVH, syncope, neck pain.     The history is provided by the patient and the EMS personnel. No  was used.     Review of patient's allergies indicates:  No Known Allergies  Past Medical History:   Diagnosis Date    Anxiety disorder, unspecified     Otitis      History reviewed. No pertinent surgical history.  Family History   Problem Relation Name Age of Onset    No Known Problems Mother      No Known Problems Father       Social History     Tobacco Use    Smoking status: Former     Types: Cigarettes    Smokeless tobacco: Never    Tobacco comments:     Vaccinations up to date. Patient is in 6th grade.    Substance Use  Topics    Alcohol use: No    Drug use: Yes     Types: Marijuana     Review of Systems   Constitutional:  Negative for chills and fever.   HENT:  Negative for sore throat.    Eyes:  Positive for pain and visual disturbance.   Respiratory:  Negative for cough and shortness of breath.    Cardiovascular:  Negative for chest pain and leg swelling.   Gastrointestinal:  Negative for abdominal pain, diarrhea, nausea and vomiting.        (-) incontinence   Genitourinary:  Negative for dysuria and hematuria.        (-) incontinence    Musculoskeletal:  Positive for back pain. Negative for neck pain.        (+) bilateral feet pain   Skin:  Negative for rash.   Neurological:  Positive for dizziness and headaches. Negative for seizures, syncope, weakness and numbness.   Psychiatric/Behavioral:  The patient is nervous/anxious.        Physical Exam     Initial Vitals [11/08/24 1446]   BP Pulse Resp Temp SpO2   (!) 183/109 68 (!) 24 98.8 °F (37.1 °C) 98 %      MAP       --         Physical Exam    Nursing note and vitals reviewed.  Constitutional: He appears well-developed and well-nourished.  Non-toxic appearance. No distress.   HENT:   Head: Normocephalic and atraumatic. Mouth/Throat: No lacerations (to tongue or lip).   Right parietal scalp tenderness, no hematoma.   Eyes: Conjunctivae and EOM are normal.   Intermittent ophthalmoplegia with lateral movement of right eye. No nystagmus.    Cardiovascular:  Normal rate, regular rhythm, normal heart sounds and intact distal pulses.           No murmur heard.  Pulses:       Dorsalis pedis pulses are 2+ on the right side and 2+ on the left side.        Posterior tibial pulses are 2+ on the right side and 2+ on the left side.   Pulmonary/Chest: Effort normal and breath sounds normal. No respiratory distress. He has no wheezes. He has no rhonchi.   Abdominal: Abdomen is soft. He exhibits no distension. There is no abdominal tenderness. There is no guarding.   Musculoskeletal:          General: No tenderness or edema.      Comments: Right Thoracic paraspinal tenderness. No midline C/T/L spine tenderness. No lesions, tenderness or edema to bilateral lower extremities.     Neurological: He is alert and oriented to person, place, and time.   No pronator drift. Leg lift intact strength to bilateral lower extremities.   Slurred speech   Skin: Skin is warm and dry.   Superficial abrasion to dorsum of right hand   Psychiatric: He has a normal mood and affect.         ED Course   Procedures  Labs Reviewed   CBC W/ AUTO DIFFERENTIAL - Abnormal       Result Value    WBC 8.07      RBC 4.99      Hemoglobin 14.2      Hematocrit 45.7      MCV 92      MCH 28.5      MCHC 31.1 (*)     RDW 13.1      Platelets 295      MPV 9.2      Immature Granulocytes 0.7 (*)     Gran # (ANC) 6.1      Immature Grans (Abs) 0.06 (*)     Lymph # 1.5      Mono # 0.4      Eos # 0.1      Baso # 0.02      nRBC 0      Gran % 75.2 (*)     Lymph % 18.1      Mono % 5.1      Eosinophil % 0.7      Basophil % 0.2      Differential Method Automated     COMPREHENSIVE METABOLIC PANEL - Abnormal    Sodium 141      Potassium 4.2      Chloride 105      CO2 23      Glucose 102      BUN 15      Creatinine 1.2      Calcium 9.8      Total Protein 7.7      Albumin 4.3      Total Bilirubin 1.2 (*)     Alkaline Phosphatase 64      AST 21      ALT 18      eGFR >60      Anion Gap 13     URINALYSIS, REFLEX TO URINE CULTURE - Abnormal    Specimen UA Urine, Clean Catch      Color, UA Yellow      Appearance, UA Clear      pH, UA 6.0      Specific Gravity, UA >1.030 (*)     Protein, UA Trace (*)     Glucose, UA Negative      Ketones, UA 1+ (*)     Bilirubin (UA) Negative      Occult Blood UA Trace (*)     Nitrite, UA Negative      Urobilinogen, UA Negative      Leukocytes, UA Trace (*)     Narrative:     Specimen Source->Urine   DRUG SCREEN PANEL, URINE EMERGENCY - Abnormal    Benzodiazepines Presumptive Positive (*)     Methadone metabolites Negative      Cocaine  (Metab.) Negative      Opiate Scrn, Ur Negative      Barbiturate Screen, Ur Negative      Amphetamine Screen, Ur Negative      THC Presumptive Positive (*)     Phencyclidine Negative      Creatinine, Urine >450.0 (*)     Toxicology Information SEE COMMENT      Narrative:     Specimen Source->Urine   CK - Abnormal     (*)    URINALYSIS MICROSCOPIC - Abnormal    RBC, UA 10 (*)     WBC, UA 5      Microscopic Comment SEE COMMENT      Narrative:     Specimen Source->Urine   POCT GLUCOSE - Abnormal    POCT Glucose 114 (*)    B-TYPE NATRIURETIC PEPTIDE    BNP <10     MAGNESIUM    Magnesium 2.0     TROPONIN I    Troponin I <0.006     TSH    TSH 0.809     ISTAT LACTATE    POC Lactate 0.90      Sample VENOUS      Site Other      Allens Test N/A       EKG Readings: (Independently Interpreted)   EKG independently interpreted by me with sinus bradycardia rate of 57, right axis deviation, T-wave inversion in inferior lead, seen on prior EKG (2018), improved today, may be normal variant, no delta wave, QTC of 387       Imaging Results              X-Ray Chest AP Portable (Final result)  Result time 11/08/24 16:33:28      Final result by Alessandro Head MD (11/08/24 16:33:28)                   Impression:      No acute process.      Electronically signed by: Alessandro Head MD  Date:    11/08/2024  Time:    16:33               Narrative:    EXAMINATION:  XR CHEST AP PORTABLE    CLINICAL HISTORY:  Altered mental status, unspecified    TECHNIQUE:  Single frontal view of the chest was performed.    COMPARISON:  09/28/2024.    FINDINGS:  Monitoring EKG leads are present.  The trachea is unremarkable.  The cardiomediastinal silhouette is within normal limits.  The hilar structures are unremarkable.  There is no evidence of free air beneath the hemidiaphragms.  There are no pleural effusions.  There is no evidence of a pneumothorax.  There is no evidence of pneumomediastinum.  No airspace opacity is present.  The osseous structures are  unremarkable.                                       CT Head Without Contrast (Final result)  Result time 11/08/24 15:33:32      Final result by Kristofer Nicholson MD (11/08/24 15:33:32)                   Impression:      No acute intracranial process/hemorrhage identified.      Electronically signed by: Kristofer Nicholson  Date:    11/08/2024  Time:    15:33               Narrative:    EXAMINATION:  CT HEAD WITHOUT CONTRAST    CLINICAL HISTORY:  Head trauma, abnormal mental status (Age 19-64y);Mental status change, unknown cause;    TECHNIQUE:  Low dose axial images were obtained through the head.  Coronal and sagittal reformations were also performed. Contrast was not administered.    COMPARISON:  None.    FINDINGS:  There is no evidence of hydrocephalus, mass effect, intracranial hemorrhage or acute territorial infarct.    The brain parenchyma maintains normal attenuation    No acute calvarial fracture is identified. The visualized sinuses and mastoid air cells are clear.                                       Medications   electrolytes-dextrose (Pedialyte) oral solution 750 mL (750 mLs Oral Given 11/8/24 1613)     Medical Decision Making  24 years old male with PMHx of anxiety, depression, prediabetes, presents to ED via EMS for headache, fall, blunt head trauma onset earlier today.       Differential diagnosis include but are not limited to:  Intoxication, anxiety, intracranial hemorrhage, seizure, syncope    Patient appears to be intoxicated vs postictal on my exam, with slurred, slow speech, intermittent ophthalmoplegia, concern for head bleed due to fall, head strike, CT head obtained, with no acute findings, no intracranial hemorrhage.  CPK mildly elevated at 496, however with normal lactate, less concern for seizure, with no witnessed shaking activity per EMS. Patient denies any substance use in the last few days. CTH obtained given AMS, with no acute intracranial abnormalities, with mental status returning to  baseline in ED stay, patient continuing to deny hx of substance use (though does have benzos in urine, THC in urine)  Otherwise normal labs for patient including CBC, trop, BNP, CMP, mg, UA.    Discharged with follow up to PMD, psychiatry.     I discussed with the patient/family the diagnosis, treatment plan, indications for return to the emergency department, as well as for expected follow-up. The patient/family verbalized an understanding. The patient/family is asked if there were any questions or concerns, which were addressed to patient/family satisfaction. Patient/family understands and is agreeable to the plan.               Amount and/or Complexity of Data Reviewed  Independent Historian: EMS     Details: See HPI  Labs: ordered. Decision-making details documented in ED Course.  Radiology: ordered. Decision-making details documented in ED Course.  ECG/medicine tests: ordered and independent interpretation performed. Decision-making details documented in ED Course.    Risk  OTC drugs.            Scribe Attestation:   Scribe #1: I performed the above scribed service and the documentation accurately describes the services I performed. I attest to the accuracy of the note.  Scribe #2: I performed the above scribed service and the documentation accurately describes the services I performed. I attest to the accuracy of the note.        ED Course as of 11/08/24 2149 Fri Nov 08, 2024   1601 CPK(!): 496 [LF]   1707 Patient reassessed, no longer with slurred speech, no ophthalmoplegia, denies any complaints at this time, reports he is feeling improved, with no anxiety, discussed findings of mild CPK elevation with mom at bedside and patient, will discharge with follow up to PMD, psychiatry [LF]   1708 CT head with no acute findings, no bleed [LF]      ED Course User Index  [LF] Bibi De La O MD                       I, Bibi De La O, personally performed the services described in this documentation. All medical record  entries made by the scribe were at my direction and in my presence. I have reviewed the chart and agree that the record reflects my personal performance and is accurate and complete.        Clinical Impression:  Final diagnoses:  [R41.82] AMS (altered mental status)  [F41.9] Anxiety (Primary)          ED Disposition Condition    Discharge Stable          ED Prescriptions    None       Follow-up Information       Follow up With Specialties Details Why Contact Info    Deniz Gomez MD Pediatrics  ED follow up, anxiety medications [address] [place] [address]  [id number]  [place] [address]  [phone]               Bibi De La O MD  11/08/24 9157

## 2024-11-09 LAB
OHS QRS DURATION: 80 MS
OHS QTC CALCULATION: 387 MS

## 2025-01-02 ENCOUNTER — HOSPITAL ENCOUNTER (EMERGENCY)
Facility: HOSPITAL | Age: 25
Discharge: HOME | End: 2025-01-02
Attending: STUDENT IN AN ORGANIZED HEALTH CARE EDUCATION/TRAINING PROGRAM
Payer: MEDICAID

## 2025-01-02 VITALS
WEIGHT: 209 LBS | OXYGEN SATURATION: 100 % | HEART RATE: 66 BPM | TEMPERATURE: 98 F | SYSTOLIC BLOOD PRESSURE: 162 MMHG | DIASTOLIC BLOOD PRESSURE: 78 MMHG | RESPIRATION RATE: 18 BRPM | HEIGHT: 69 IN | BODY MASS INDEX: 30.96 KG/M2

## 2025-01-02 DIAGNOSIS — R31.9 HEMATURIA, UNSPECIFIED TYPE: ICD-10-CM

## 2025-01-02 DIAGNOSIS — R07.9 CHEST PAIN: ICD-10-CM

## 2025-01-02 DIAGNOSIS — G89.29 CHRONIC NONINTRACTABLE HEADACHE, UNSPECIFIED HEADACHE TYPE: Primary | ICD-10-CM

## 2025-01-02 DIAGNOSIS — R51.9 CHRONIC NONINTRACTABLE HEADACHE, UNSPECIFIED HEADACHE TYPE: Primary | ICD-10-CM

## 2025-01-02 LAB
ALBUMIN SERPL BCP-MCNC: 4.5 G/DL (ref 3.5–5.2)
ALP SERPL-CCNC: 66 U/L (ref 40–150)
ALT SERPL W/O P-5'-P-CCNC: 24 U/L (ref 10–44)
ANION GAP SERPL CALC-SCNC: 12 MMOL/L (ref 8–16)
AST SERPL-CCNC: 24 U/L (ref 10–40)
BACTERIA #/AREA URNS HPF: ABNORMAL /HPF
BASOPHILS # BLD AUTO: 0.03 K/UL (ref 0–0.2)
BASOPHILS NFR BLD: 0.4 % (ref 0–1.9)
BILIRUB SERPL-MCNC: 2.6 MG/DL (ref 0.1–1)
BILIRUB UR QL STRIP: ABNORMAL
BUN SERPL-MCNC: 19 MG/DL (ref 6–20)
CALCIUM SERPL-MCNC: 9.7 MG/DL (ref 8.7–10.5)
CHLORIDE SERPL-SCNC: 106 MMOL/L (ref 95–110)
CLARITY UR: CLEAR
CO2 SERPL-SCNC: 21 MMOL/L (ref 23–29)
COLOR UR: YELLOW
CREAT SERPL-MCNC: 1.1 MG/DL (ref 0.5–1.4)
CTP QC/QA: YES
DIFFERENTIAL METHOD BLD: ABNORMAL
EOSINOPHIL # BLD AUTO: 0 K/UL (ref 0–0.5)
EOSINOPHIL NFR BLD: 0.3 % (ref 0–8)
ERYTHROCYTE [DISTWIDTH] IN BLOOD BY AUTOMATED COUNT: 12.6 % (ref 11.5–14.5)
EST. GFR  (NO RACE VARIABLE): >60 ML/MIN/1.73 M^2
GLUCOSE SERPL-MCNC: 95 MG/DL (ref 70–110)
GLUCOSE UR QL STRIP: NEGATIVE
HCT VFR BLD AUTO: 48.5 % (ref 40–54)
HGB BLD-MCNC: 15.8 G/DL (ref 14–18)
HGB UR QL STRIP: ABNORMAL
HYALINE CASTS #/AREA URNS LPF: 1 /LPF
IMM GRANULOCYTES # BLD AUTO: 0.03 K/UL (ref 0–0.04)
IMM GRANULOCYTES NFR BLD AUTO: 0.4 % (ref 0–0.5)
KETONES UR QL STRIP: ABNORMAL
LEUKOCYTE ESTERASE UR QL STRIP: NEGATIVE
LYMPHOCYTES # BLD AUTO: 2 K/UL (ref 1–4.8)
LYMPHOCYTES NFR BLD: 26.4 % (ref 18–48)
MAGNESIUM SERPL-MCNC: 2.2 MG/DL (ref 1.6–2.6)
MCH RBC QN AUTO: 29 PG (ref 27–31)
MCHC RBC AUTO-ENTMCNC: 32.6 G/DL (ref 32–36)
MCV RBC AUTO: 89 FL (ref 82–98)
MICROSCOPIC COMMENT: ABNORMAL
MOLECULAR STREP A: NEGATIVE
MONOCYTES # BLD AUTO: 0.5 K/UL (ref 0.3–1)
MONOCYTES NFR BLD: 7 % (ref 4–15)
NEUTROPHILS # BLD AUTO: 5 K/UL (ref 1.8–7.7)
NEUTROPHILS NFR BLD: 65.5 % (ref 38–73)
NITRITE UR QL STRIP: NEGATIVE
NRBC BLD-RTO: 0 /100 WBC
PH UR STRIP: 6 [PH] (ref 5–8)
PHOSPHATE SERPL-MCNC: 3.4 MG/DL (ref 2.7–4.5)
PLATELET # BLD AUTO: 318 K/UL (ref 150–450)
PMV BLD AUTO: 9 FL (ref 9.2–12.9)
POCT GLUCOSE: 97 MG/DL (ref 70–110)
POTASSIUM SERPL-SCNC: 4.2 MMOL/L (ref 3.5–5.1)
PROT SERPL-MCNC: 7.9 G/DL (ref 6–8.4)
PROT UR QL STRIP: ABNORMAL
RBC # BLD AUTO: 5.44 M/UL (ref 4.6–6.2)
RBC #/AREA URNS HPF: 10 /HPF (ref 0–4)
SODIUM SERPL-SCNC: 139 MMOL/L (ref 136–145)
SP GR UR STRIP: >1.03 (ref 1–1.03)
TSH SERPL DL<=0.005 MIU/L-ACNC: 0.79 UIU/ML (ref 0.4–4)
URN SPEC COLLECT METH UR: ABNORMAL
UROBILINOGEN UR STRIP-ACNC: ABNORMAL EU/DL
WBC # BLD AUTO: 7.69 K/UL (ref 3.9–12.7)
WBC #/AREA URNS HPF: 2 /HPF (ref 0–5)

## 2025-01-02 PROCEDURE — 81000 URINALYSIS NONAUTO W/SCOPE: CPT | Performed by: STUDENT IN AN ORGANIZED HEALTH CARE EDUCATION/TRAINING PROGRAM

## 2025-01-02 PROCEDURE — 80053 COMPREHEN METABOLIC PANEL: CPT | Performed by: STUDENT IN AN ORGANIZED HEALTH CARE EDUCATION/TRAINING PROGRAM

## 2025-01-02 PROCEDURE — 85025 COMPLETE CBC W/AUTO DIFF WBC: CPT | Performed by: STUDENT IN AN ORGANIZED HEALTH CARE EDUCATION/TRAINING PROGRAM

## 2025-01-02 PROCEDURE — 82962 GLUCOSE BLOOD TEST: CPT

## 2025-01-02 PROCEDURE — 25000003 PHARM REV CODE 250: Performed by: STUDENT IN AN ORGANIZED HEALTH CARE EDUCATION/TRAINING PROGRAM

## 2025-01-02 PROCEDURE — 84100 ASSAY OF PHOSPHORUS: CPT | Performed by: STUDENT IN AN ORGANIZED HEALTH CARE EDUCATION/TRAINING PROGRAM

## 2025-01-02 PROCEDURE — 93005 ELECTROCARDIOGRAM TRACING: CPT

## 2025-01-02 PROCEDURE — 99285 EMERGENCY DEPT VISIT HI MDM: CPT | Mod: 25

## 2025-01-02 PROCEDURE — 93010 ELECTROCARDIOGRAM REPORT: CPT | Mod: ,,, | Performed by: INTERNAL MEDICINE

## 2025-01-02 PROCEDURE — 84443 ASSAY THYROID STIM HORMONE: CPT | Performed by: STUDENT IN AN ORGANIZED HEALTH CARE EDUCATION/TRAINING PROGRAM

## 2025-01-02 PROCEDURE — 83735 ASSAY OF MAGNESIUM: CPT | Performed by: STUDENT IN AN ORGANIZED HEALTH CARE EDUCATION/TRAINING PROGRAM

## 2025-01-02 RX ORDER — BUTALBITAL, ACETAMINOPHEN AND CAFFEINE 50; 325; 40 MG/1; MG/1; MG/1
1 TABLET ORAL
Status: COMPLETED | OUTPATIENT
Start: 2025-01-02 | End: 2025-01-02

## 2025-01-02 RX ORDER — BUTALBITAL, ACETAMINOPHEN AND CAFFEINE 50; 325; 40 MG/1; MG/1; MG/1
1 TABLET ORAL EVERY 6 HOURS PRN
Qty: 20 TABLET | Refills: 0 | Status: SHIPPED | OUTPATIENT
Start: 2025-01-02 | End: 2025-01-22

## 2025-01-02 RX ORDER — ACETAMINOPHEN 325 MG/1
650 TABLET ORAL
Status: COMPLETED | OUTPATIENT
Start: 2025-01-02 | End: 2025-01-02

## 2025-01-02 RX ADMIN — BUTALBITAL, ACETAMINOPHEN, AND CAFFEINE 1 TABLET: 325; 50; 40 TABLET ORAL at 06:01

## 2025-01-02 RX ADMIN — ACETAMINOPHEN 650 MG: 325 TABLET ORAL at 02:01

## 2025-01-02 NOTE — ED PROVIDER NOTES
"Encounter Date: 1/2/2025    SCRIBE #1 NOTE: I, Sheri Burks, am scribing for, and in the presence of,  Thao Badillo DO. I have scribed the following portions of the note - Other sections scribed: HPI, ROS, PE.       History     Chief Complaint   Patient presents with    Multiple Complaints     States has been experiencing sore throat, mouth numbness and tingling, "throat closing up when I eat",  decreased strength in hands/trouble opening bottles, walking on the sides of his feet, and "black spots" on his feet intermittently since 12/31. Unsure of exacerbating or alleviating factors. CBG 97 in triage.      24 y.o. male, with a PMHx of anxiety, depression, and prediabetes, presents to the ED for evaluation of multiple complaints. Patient reports sore throat x 2 days, decreased food intake x 2-3 days d/t sore throat, right flank pain onset yesterday, right upper chest pain, dysuria, urine color change, bilateral hand pain and decreased strength, and bilateral feet pain, swelling, and erythema. He rates his current pain at 9/10. He reports chronic right-sided and left forehead headaches and states he has an upcoming neurology appointment on 2/18/2025. He reports associated difficulty walking d/t feet pain. He reports intermittent black spots on his feet and states his toenails hurt and will fall off. Denies attempting treatment with analgesics. Denies smoking, EtOH consumption, or illicit drug use. Reports remote history of smoking marijuana. Denies any known sick contacts. Denies any STD concerns. No other exacerbating or alleviating factors. Denies cough, congestion, fever, SOB, abdominal pain, penile discharge, or other associated symptoms. NKDA.     The history is provided by the patient. No  was used.     Review of patient's allergies indicates:  No Known Allergies  Past Medical History:   Diagnosis Date    Anxiety disorder, unspecified     Otitis      History reviewed. No pertinent " surgical history.  Family History   Problem Relation Name Age of Onset    No Known Problems Mother      No Known Problems Father       Social History     Tobacco Use    Smoking status: Former     Types: Cigarettes    Smokeless tobacco: Never    Tobacco comments:     Vaccinations up to date. Patient is in 6th grade.    Substance Use Topics    Alcohol use: No    Drug use: Yes     Types: Marijuana     Review of Systems   Constitutional:  Negative for chills and fever.   HENT:  Positive for sore throat. Negative for congestion.    Eyes:  Negative for visual disturbance.   Respiratory:  Negative for cough and shortness of breath.    Cardiovascular:  Positive for chest pain (R upper).   Gastrointestinal:  Negative for abdominal pain, nausea and vomiting.   Genitourinary:  Positive for dysuria and flank pain (R).        (+) urine color change   Musculoskeletal:  Negative for back pain, neck pain and neck stiffness.        (+) bilateral feet swelling, pain, erythema   Skin:  Negative for rash.   Neurological:  Positive for headaches (R side, L forehead). Negative for syncope, weakness and light-headedness.   Psychiatric/Behavioral:  Negative for confusion, hallucinations and suicidal ideas.        Physical Exam     Initial Vitals [01/02/25 1246]   BP Pulse Resp Temp SpO2   (!) 144/88 98 17 98.6 °F (37 °C) 98 %      MAP       --         Physical Exam    Nursing note and vitals reviewed.  Constitutional: He appears well-developed and well-nourished. He is not diaphoretic.  Non-toxic appearance. He does not have a sickly appearance. He does not appear ill.   Anxious-appearing.   HENT:   Head: Normocephalic and atraumatic.   Right Ear: External ear normal.   Left Ear: External ear normal. Mouth/Throat: Uvula is midline and mucous membranes are normal. No trismus in the jaw. No uvula swelling. Posterior oropharyngeal erythema (mild) present. No oropharyngeal exudate or posterior oropharyngeal edema.   Eyes: Conjunctivae and EOM  are normal. Pupils are equal, round, and reactive to light. Right conjunctiva is not injected. Left conjunctiva is not injected. No scleral icterus.   sclera anicteric   Neck: Trachea normal and phonation normal. Neck supple. No stridor present. No tracheal tenderness present. No tracheal deviation present. No JVD present.   Normal range of motion.   Full passive range of motion without pain.     Cardiovascular:  Regular rhythm, S1 normal, S2 normal, normal heart sounds and intact distal pulses.     Exam reveals no gallop and no friction rub.       No murmur heard.  Pulses:       Radial pulses are 2+ on the right side and 2+ on the left side.   Pulmonary/Chest: Effort normal and breath sounds normal. No stridor. No respiratory distress.   Abdominal: Abdomen is soft. He exhibits no distension. There is no abdominal tenderness. There is no rebound and no guarding.   Musculoskeletal:         General: No tenderness or edema. Normal range of motion.      Cervical back: Normal, full passive range of motion without pain, normal range of motion and neck supple.      Thoracic back: Normal.      Lumbar back: Normal.      Right foot: Normal. Normal range of motion and normal capillary refill. No swelling, deformity, bunion, Charcot foot, foot drop, prominent metatarsal heads, laceration, tenderness, bony tenderness or crepitus. Normal pulse.      Left foot: Normal. Normal range of motion and normal capillary refill. No swelling, deformity, bunion, Charcot foot, foot drop, prominent metatarsal heads, laceration, tenderness, bony tenderness or crepitus. Normal pulse.      Comments: Good active ROM of all extremities. No lower extremity edema or cyanosis.     Neurological: He is alert and oriented to person, place, and time. He has normal strength. He displays no atrophy and no tremor. No cranial nerve deficit or sensory deficit. He exhibits normal muscle tone. He displays no seizure activity. Gait normal. GCS score is 15. GCS  eye subscore is 4. GCS verbal subscore is 5. GCS motor subscore is 6.   Moves all extremities, follows all commands, no focal neurologic deficits.      Skin: Skin is warm and dry. No ecchymosis and no rash noted. No erythema.   Superficial abrasion to the right parietal scalp without evidence of induration, fluctuance, purulent drainage or increased warmth noted.         ED Course   Procedures  Labs Reviewed   CBC W/ AUTO DIFFERENTIAL - Abnormal       Result Value    WBC 7.69      RBC 5.44      Hemoglobin 15.8      Hematocrit 48.5      MCV 89      MCH 29.0      MCHC 32.6      RDW 12.6      Platelets 318      MPV 9.0 (*)     Immature Granulocytes 0.4      Gran # (ANC) 5.0      Immature Grans (Abs) 0.03      Lymph # 2.0      Mono # 0.5      Eos # 0.0      Baso # 0.03      nRBC 0      Gran % 65.5      Lymph % 26.4      Mono % 7.0      Eosinophil % 0.3      Basophil % 0.4      Differential Method Automated     COMPREHENSIVE METABOLIC PANEL - Abnormal    Sodium 139      Potassium 4.2      Chloride 106      CO2 21 (*)     Glucose 95      BUN 19      Creatinine 1.1      Calcium 9.7      Total Protein 7.9      Albumin 4.5      Total Bilirubin 2.6 (*)     Alkaline Phosphatase 66      AST 24      ALT 24      eGFR >60      Anion Gap 12     URINALYSIS, REFLEX TO URINE CULTURE - Abnormal    Specimen UA Urine, Clean Catch      Color, UA Yellow      Appearance, UA Clear      pH, UA 6.0      Specific Gravity, UA >1.030 (*)     Protein, UA 1+ (*)     Glucose, UA Negative      Ketones, UA 3+ (*)     Bilirubin (UA) 1+ (*)     Occult Blood UA 1+ (*)     Nitrite, UA Negative      Urobilinogen, UA 4.0-6.0 (*)     Leukocytes, UA Negative      Narrative:     Specimen Source->Urine   URINALYSIS MICROSCOPIC - Abnormal    RBC, UA 10 (*)     WBC, UA 2      Bacteria None      Hyaline Casts, UA 1      Microscopic Comment SEE COMMENT      Narrative:     Specimen Source->Urine   MAGNESIUM    Magnesium 2.2     PHOSPHORUS    Phosphorus 3.4     TSH     TSH 0.789     POCT STREP A MOLECULAR    Molecular Strep A, POC Negative       Acceptable Yes     POCT GLUCOSE    POCT Glucose 97       EKG Readings: (Independently Interpreted)   EKG obtained at 2:00 p.m. shows sinus rhythm with a heart rate of 84 beats per minute, normal axis and intervals, no acute ST segment changes or T-wave abnormality.  EKG with improved bradycardia when compared to previous EKG from November 20, 2024.     ECG Results              EKG 12-lead (Final result)        Collection Time Result Time QRS Duration OHS QTC Calculation    01/02/25 14:00:06 01/03/25 19:19:40 74 408                     Final result by Interface, Lab In [facility] (01/03/25 19:19:47)                   Narrative:    Test Reason : R07.9,    Vent. Rate :  84 BPM     Atrial Rate :  84 BPM     P-R Int : 172 ms          QRS Dur :  74 ms      QT Int : 346 ms       P-R-T Axes :  61  89  27 degrees    QTcB Int : 408 ms    Normal sinus rhythm  Normal ECG  When compared with ECG of 08-Nov-2024 15:04,  No significant change was found  Confirmed by Yuriy Stauffer ([id number]) on 1/3/2025 7:19:37 PM    Referred By: AAAREFERRAL SELF           Confirmed By: Yuriy Stauffer                                     EKG 12-lead (Final result)  Result time 01/05/25 11:08:31      Final result by Unknown User (01/05/25 11:08:31)                                         EKG 12-lead (Final result)  Result time 01/13/25 13:14:49      Final result by Unknown User (01/13/25 13:14:49)                                      Imaging Results              CT Head Without Contrast (Final result)  Result time 01/02/25 15:53:15      Final result by Talha Dinh MD (01/02/25 15:53:15)                   Impression:      No evidence for acute intracranial pathology.      Electronically signed by: Talha Dinh  Date:    01/02/2025  Time:    15:53               Narrative:    EXAMINATION:  CT HEAD WITHOUT CONTRAST    CLINICAL HISTORY:  Headache,  new or worsening, neuro deficit (Age 19-49y);    TECHNIQUE:  Low dose axial CT images obtained throughout the head without intravenous contrast. Sagittal and coronal reconstructions were performed.    COMPARISON:  CT head without contrast 11/08/2024.    FINDINGS:  Intracranial compartment:    Ventricles and sulci are normal in size for age without evidence of hydrocephalus. No extra-axial blood or fluid collections.    The brain parenchyma appears normal. No parenchymal mass, hemorrhage, edema or major vascular distribution infarct.    Skull/extracranial contents (limited evaluation): No fracture. Mastoid air cells and paranasal sinuses are essentially clear.                                       X-Ray Chest AP Portable (Final result)  Result time 01/02/25 15:44:46      Final result by Cody Atkins MD (01/02/25 15:44:46)                   Impression:      1. No acute cardiopulmonary process.      Electronically signed by: Cody Atkins MD  Date:    01/02/2025  Time:    15:44               Narrative:    EXAMINATION:  XR CHEST AP PORTABLE    CLINICAL HISTORY:  chest pain;    TECHNIQUE:  Single frontal view of the chest was performed.    COMPARISON:  11/08/2024    FINDINGS:  The cardiomediastinal silhouette is not enlarged.  There is no pleural effusion.  The trachea is midline.  The lungs are symmetrically expanded bilaterally without evidence of acute parenchymal process. No large focal consolidation seen.  There is no pneumothorax.  The osseous structures are unremarkable.                                       Medications   acetaminophen tablet 650 mg (650 mg Oral Given 1/2/25 1451)   butalbital-acetaminophen-caffeine -40 mg per tablet 1 tablet (1 tablet Oral Given 1/2/25 1810)     Medical Decision Making  MDM  This is an emergent evaluation of a 24 y.o. male with multiple complaints. Initial vitals in the ED  [01/02/25 1246]  BP: (!) 144/88  Pulse: 98  Resp: 17  Temp: 98.6 °F (37 °C)  SpO2: 98 % .      Physical exam noted above. DDx includes but is not limited to chronic pain, migraine versus tension headache, musculoskeletal pain, electrolyte abnormality UTI, NIKITA, kidney stone, strep pharyngitis versus COVID versus influenza versus other viral illness. Also considered but clinically less likely to be sepsis, meningitis, subarachnoid hemorrhage, stroke, ACS. Will obtain labs and imaging including CBC, CMP, UA, point of care glucose, TSH, strep, magnesium, phosphorus, CT head without contrast, chest x-ray and EKG . Will also provide p.o. pain medication. Will continue to monitor and frequently reassess pending results of labs, treatments and final disposition.    Patient is aware of plan and is amenable.     Thao Badillo D.O  EMERGENCY MEDICINE  1:25 PM 01/02/2025    UPDATE  Labs reveal elevated total bili at 2.6 which appears slightly increased when compared to previous labs from November it was 1.2.  UA reveals 1+ protein, 3+ ketones.  Negative leukocyte esterase and nitrites.  Remainder of labs unremarkable.  Chest x-ray shows no acute abnormality.  EKG shows no acute STEMI or concerning findings.  CT head shows no acute abnormality.  Patient was initially treated with p.o. Tylenol.  On reassessment his pain was unchanged.  He was then given Fioricet.  On reassessment his pain was improved.  He was able to ambulate in the ED without difficulty or assistance.  /78 however patient currently asymptomatic.  Given history, presentation in the setting of benign exam and workup here today, I feel symptoms are less likely due to a life-threatening cause at this time and more consistent with migraine versus tension headache.  Will discharge with Fioricet, PCP follow-up in ED return precautions.  Patient is aware and agreeable to plan.    Thao Badillo D.O  EMERGENCY MEDICINE   5:59 PM 01/02/2025       This chart was completed using dictation software, as a result there may be some  transcription errors       Amount and/or Complexity of Data Reviewed  External Data Reviewed: labs, radiology, ECG and notes.  Labs: ordered. Decision-making details documented in ED Course.  Radiology: ordered and independent interpretation performed. Decision-making details documented in ED Course.  ECG/medicine tests: ordered and independent interpretation performed. Decision-making details documented in ED Course.  Discussion of management or test interpretation with external provider(s):      Risk  OTC drugs.  Prescription drug management.            Scribe Attestation:   Scribe #1: I performed the above scribed service and the documentation accurately describes the services I performed. I attest to the accuracy of the note.                             I, Thao Badillo DO, personally performed the services described in this documentation. All medical record entries made by the scribe were at my direction and in my presence. I have reviewed the chart and agree that the record reflects my personal performance and is accurate and complete.      DISCLAIMER: This note was prepared with [employer] voice recognition transcription software. Garbled syntax, mangled pronouns, and other bizarre constructions may be attributed to that software system.   Clinical Impression:  Final diagnoses:  [R07.9] Chest pain  [R51.9, G89.29] Chronic nonintractable headache, unspecified headache type (Primary)  [R31.9] Hematuria, unspecified type          ED Disposition Condition    Discharge Stable          ED Prescriptions       Medication Sig Dispense Start Date End Date Auth. Provider    butalbital-acetaminophen-caffeine -40 mg (FIORICET, ESGIC) -40 mg per tablet Take 1 tablet by mouth every 6 (six) hours as needed for Pain or Headaches. 20 tablet 1/2/2025 1/22/2025 Thao Badillo DO          Follow-up Information       Follow up With Specialties Details Why Contact Info    Your primary care doctor   Schedule an appointment as soon as possible for a visit on 1/6/2025 Emergency Room Follow-up [facility] - Emergency Dept Emergency Medicine Go to  If symptoms worsen [address] [facility] [place] [address]  [phone]             Thao Badillo, DO  01/16/25 8353

## 2025-01-02 NOTE — ED NOTES
Patient reports headache worse then before he was given tylenol, patient also reports numbness on the right side of his face. Dr. Shanks notified of this.

## 2025-01-02 NOTE — ED TRIAGE NOTES
Patient reports having sore throat and unable to tolerate food, patient also reports having pain and black lesions on his hands and feet making him unable to open bottles and walk on his feet. Patient also reports pain on the right side of the chest. Denies any shortness of breath, fever, cough or colds.

## 2025-01-03 LAB
OHS QRS DURATION: 74 MS
OHS QTC CALCULATION: 408 MS

## 2025-01-03 NOTE — DISCHARGE INSTRUCTIONS
Thank you for coming to our Emergency Department today. It is important to remember that some problems or medical conditions are difficult to diagnose and may not be found during your Emergency Department visit.     Be sure to follow up with your primary care doctor and review all labs/imaging/tests that were performed during your ER visit with them. Some labs/tests may be outside of the normal range and require non-emergent follow-up and further investigation to help diagnose/exclude/prevent complications or other potentially serious medical conditions that were not addressed during your ER visit.    If you do not have a primary care doctor, you may contact the one listed on your discharge paperwork or you may also call the [facility] Appointment Desk at [phone] to schedule an appointment and establish care with one. It is important to your health that you have a primary care doctor.    Please take all medications as directed. All medications may potentially have side-effects and it is impossible to predict which medications may give you side-effects or what side-effects (if any) they will give you.. If you feel that you are having a negative effect or side-effect of any medication you should immediately stop taking them and seek medical attention. If you feel that you are having a life-threatening reaction call 911.    Return to the ER with any questions/concerns, new/concerning symptoms, worsening or failure to improve.     Do not drive, swim, climb to height, take a bath, operate heavy machinery, drink alcohol or take potentially sedating medications, sign any legal documents or make any important decisions for 24 hours if you have received any pain medications, sedatives or mood altering drugs during your ER visit or within 24 hours of taking them if they have been prescribed to you.     You can find additional resources for Dentists, hearing aids, durable medical equipment, low cost pharmacies and  other resources at [email or web address].org    BELOW THIS LINE ONLY APPLIES IF YOU HAVE A COVID TEST PENDING OR IF YOU HAVE BEEN DIAGNOSED WITH COVID:  Please access [facility] to review the results of your test. Until the results of your COVID test return, you should isolate yourself so as not to potentially spread illness to others.   If your COVID test returns positive, you should isolate yourself so as not to spread illness to others. After five full days, if you are feeling better and you have not had fever for 24 hours, you can return to your typical daily activities, but you must wear a mask around others for an additional 5 days.   If your COVID test returns negative and you are either unvaccinated or more than six months out from your two-dose vaccine and are not yet boosted, you should still quarantine for 5 full days followed by strict mask use for an additional 5 full days.   If your COVID test returns negative and you have received your 2-dose initial vaccine as well as a booster, you should continue strict mask use for 10 full days after the exposure.  For all those exposed, best practice includes a test at day 5 after the exposure. This can be a home test or a test through one of the many testing centers throughout our community.   Masking is always advised to limit the spread of COVID. Cdc.gov is an excellent site to obtain the latest up to date recommendations regarding COVID and COVID testing.     CDC Testing and Quarantine Guidelines for patients with exposure to a known-positive COVID-19 person:  A close exposure is defined as anyone who has had an exposure (masked or unmasked) to a known COVID -19 positive person within 6 feet of someone for a cumulative total of 15 minutes or more over a 24-hour period.   Vaccinated and/or if you recently had a positive covid test within 90 days do NOT need to quarantine after contact with someone who had COVID-19 unless you develop symptoms.   Fully vaccinated  people who have not had a positive test within 90 days, should get tested 3-5 days after their exposure, even if they don't have symptoms and wear a mask indoors in public for 14 days following exposure or until their test result is negative.      Unvaccinated and/or NOT had a positive test within 90 days and meet close exposure  You are required by CDC guidelines to quarantine for at least 5 days from time of exposure followed by 5 days of strict masking. It is recommended, but not required to test after 5 days, unless you develop symptoms, in which case you should test at that time.  If you get tested after 5 days and your test is positive, your 5 day period of isolation starts the day of the positive test.    If your exposure does not meet the above definition, you can return to your normal daily activities to include social distancing, wearing a mask and frequent handwashing.      Here is a link to guidance from the CDC:  https://www.cdc.gov/media/releases/2021/s1227-isolation-quarantine-guidance.html      [place] Dept Of Health Testing Sites:  https://ldh.[place].gov/page/3934      [facility] website with testing locations and guidance:  [email or web address]

## 2025-05-10 ENCOUNTER — HOSPITAL ENCOUNTER (EMERGENCY)
Facility: HOSPITAL | Age: 25
Discharge: HOME | End: 2025-05-10
Attending: EMERGENCY MEDICINE
Payer: MEDICAID

## 2025-05-10 VITALS
DIASTOLIC BLOOD PRESSURE: 77 MMHG | SYSTOLIC BLOOD PRESSURE: 119 MMHG | TEMPERATURE: 99 F | WEIGHT: 215 LBS | HEART RATE: 57 BPM | OXYGEN SATURATION: 100 % | RESPIRATION RATE: 16 BRPM | BODY MASS INDEX: 31.84 KG/M2 | HEIGHT: 69 IN

## 2025-05-10 DIAGNOSIS — K13.79 ORAL BLEEDING: Primary | ICD-10-CM

## 2025-05-10 LAB
ABSOLUTE EOSINOPHIL (OHS): 0.06 K/UL
ABSOLUTE MONOCYTE (OHS): 0.33 K/UL (ref 0.3–1)
ABSOLUTE NEUTROPHIL COUNT (OHS): 2.54 K/UL (ref 1.8–7.7)
ALBUMIN SERPL BCP-MCNC: 4.1 G/DL (ref 3.5–5.2)
ALP SERPL-CCNC: 54 UNIT/L (ref 40–150)
ALT SERPL W/O P-5'-P-CCNC: 31 UNIT/L (ref 10–44)
ANION GAP (OHS): 11 MMOL/L (ref 8–16)
AST SERPL-CCNC: 23 UNIT/L (ref 11–45)
BASOPHILS # BLD AUTO: 0.04 K/UL
BASOPHILS NFR BLD AUTO: 0.8 %
BILIRUB SERPL-MCNC: 0.5 MG/DL (ref 0.1–1)
BUN SERPL-MCNC: 9 MG/DL (ref 6–20)
CALCIUM SERPL-MCNC: 9.1 MG/DL (ref 8.7–10.5)
CHLORIDE SERPL-SCNC: 103 MMOL/L (ref 95–110)
CO2 SERPL-SCNC: 24 MMOL/L (ref 23–29)
CREAT SERPL-MCNC: 1 MG/DL (ref 0.5–1.4)
ERYTHROCYTE [DISTWIDTH] IN BLOOD BY AUTOMATED COUNT: 12.7 % (ref 11.5–14.5)
GFR SERPLBLD CREATININE-BSD FMLA CKD-EPI: >60 ML/MIN/1.73/M2
GLUCOSE SERPL-MCNC: 92 MG/DL (ref 70–110)
HCT VFR BLD AUTO: 43.4 % (ref 40–54)
HGB BLD-MCNC: 13.7 GM/DL (ref 14–18)
IMM GRANULOCYTES # BLD AUTO: 0.02 K/UL (ref 0–0.04)
IMM GRANULOCYTES NFR BLD AUTO: 0.4 % (ref 0–0.5)
INDIRECT COOMBS: NORMAL
INR PPP: 0.9 (ref 0.8–1.2)
LIPASE SERPL-CCNC: 35 U/L (ref 4–60)
LYMPHOCYTES # BLD AUTO: 2.21 K/UL (ref 1–4.8)
MCH RBC QN AUTO: 28.5 PG (ref 27–31)
MCHC RBC AUTO-ENTMCNC: 31.6 G/DL (ref 32–36)
MCV RBC AUTO: 90 FL (ref 82–98)
NUCLEATED RBC (/100WBC) (OHS): 0 /100 WBC
PLATELET # BLD AUTO: 291 K/UL (ref 150–450)
PMV BLD AUTO: 9.2 FL (ref 9.2–12.9)
POTASSIUM SERPL-SCNC: 4.1 MMOL/L (ref 3.5–5.1)
PROT SERPL-MCNC: 7.4 GM/DL (ref 6–8.4)
PROTHROMBIN TIME: 10.5 SECONDS (ref 9–12.5)
RBC # BLD AUTO: 4.8 M/UL (ref 4.6–6.2)
RELATIVE EOSINOPHIL (OHS): 1.2 %
RELATIVE LYMPHOCYTE (OHS): 42.5 % (ref 18–48)
RELATIVE MONOCYTE (OHS): 6.3 % (ref 4–15)
RELATIVE NEUTROPHIL (OHS): 48.8 % (ref 38–73)
RH BLD: NORMAL
SODIUM SERPL-SCNC: 138 MMOL/L (ref 136–145)
SPECIMEN OUTDATE: NORMAL
WBC # BLD AUTO: 5.2 K/UL (ref 3.9–12.7)

## 2025-05-10 PROCEDURE — 80053 COMPREHEN METABOLIC PANEL: CPT | Performed by: EMERGENCY MEDICINE

## 2025-05-10 PROCEDURE — 93005 ELECTROCARDIOGRAM TRACING: CPT

## 2025-05-10 PROCEDURE — 85610 PROTHROMBIN TIME: CPT | Performed by: EMERGENCY MEDICINE

## 2025-05-10 PROCEDURE — 83690 ASSAY OF LIPASE: CPT | Performed by: EMERGENCY MEDICINE

## 2025-05-10 PROCEDURE — 85025 COMPLETE CBC W/AUTO DIFF WBC: CPT | Performed by: EMERGENCY MEDICINE

## 2025-05-10 PROCEDURE — 99284 EMERGENCY DEPT VISIT MOD MDM: CPT | Mod: 25

## 2025-05-10 PROCEDURE — 93010 ELECTROCARDIOGRAM REPORT: CPT | Mod: ,,, | Performed by: INTERNAL MEDICINE

## 2025-05-10 PROCEDURE — 86850 RBC ANTIBODY SCREEN: CPT | Performed by: EMERGENCY MEDICINE

## 2025-05-10 RX ORDER — PANTOPRAZOLE SODIUM 40 MG/10ML
80 INJECTION, POWDER, LYOPHILIZED, FOR SOLUTION INTRAVENOUS
Status: DISCONTINUED | OUTPATIENT
Start: 2025-05-10 | End: 2025-05-10

## 2025-05-10 RX ORDER — DROPERIDOL 2.5 MG/ML
0.62 INJECTION, SOLUTION INTRAMUSCULAR; INTRAVENOUS
Status: DISCONTINUED | OUTPATIENT
Start: 2025-05-10 | End: 2025-05-10

## 2025-05-10 NOTE — ED PROVIDER NOTES
Encounter Date: 5/10/2025    SCRIBE #1 NOTE: I, Rimma Knowles, am scribing for, and in the presence of,  Dilan Byrd MD. I have scribed the following portions of the note - Other sections scribed: HPI, ROS, PE.       History     Chief Complaint   Patient presents with    Vomiting     Pt c/o vomiting clots of blood since this morning. Pt stated he had uncontrolled vomiting. Pt has blood in his mouth. Pt denied blood in stool or urine.     Casey Sen III is a 25 y.o. male, with a PMHx of migraines, who presents to the ED with oral bleeding PTA. Patient reports he felt very nauseous then noticed there was blood with clots coming from his mouth. States he has also had some intermittent RUQ abdominal pain recently, as well as unrelated hematuria, but states both have resolved today. Denies smoking clove cigars. No other exacerbating or alleviating factors. Denies emesis, sore throat, fever, cough, epistaxis, or other associated symptoms.       The history is provided by the patient. No  was used.     Review of patient's allergies indicates:  No Known Allergies  Past Medical History:   Diagnosis Date    Anxiety disorder, unspecified     Otitis      History reviewed. No pertinent surgical history.  Family History   Problem Relation Name Age of Onset    No Known Problems Mother      No Known Problems Father       Social History[1]  Review of Systems   Constitutional:  Negative for fever.   HENT:  Negative for sore throat.         (+) Oral bleeding.    Respiratory:  Negative for cough and shortness of breath.    Cardiovascular:  Negative for chest pain and leg swelling.   Gastrointestinal:  Positive for abdominal pain (resolved) and nausea. Negative for diarrhea and vomiting.   Genitourinary:  Positive for hematuria (resolved). Negative for dysuria.   Musculoskeletal:  Negative for back pain and neck pain.   Skin:  Negative for rash.   Neurological:  Negative for syncope.        Physical Exam     Initial Vitals [05/10/25 1244]   BP Pulse Resp Temp SpO2   116/75 64 18 98.6 °F (37 °C) 99 %      MAP       --         Physical Exam    Nursing note and vitals reviewed.  Constitutional: He appears well-developed and well-nourished.   HENT:   Dried blood to lips with venous-appearing old blood in the oropharynx and on the tongue. No sites of active bleeding noted.    Eyes: EOM are normal. Pupils are equal, round, and reactive to light.   Neck: Neck supple. No JVD present.   Normal range of motion.  Cardiovascular:  Normal rate, regular rhythm, normal heart sounds and intact distal pulses.     Exam reveals no gallop and no friction rub.       No murmur heard.  Pulmonary/Chest: Breath sounds normal. No respiratory distress.   Abdominal: Abdomen is soft. Bowel sounds are normal.   Musculoskeletal:         General: Normal range of motion.      Cervical back: Normal range of motion and neck supple.     Lymphadenopathy:     He has no cervical adenopathy.   Neurological: He is alert and oriented to person, place, and time. He has normal strength.   Skin: Skin is warm and dry.   Psychiatric: He has a normal mood and affect. Thought content normal.         ED Course   Procedures  Labs Reviewed   CBC WITH DIFFERENTIAL - Abnormal       Result Value    WBC 5.20      RBC 4.80      HGB 13.7 (*)     HCT 43.4      MCV 90      MCH 28.5      MCHC 31.6 (*)     RDW 12.7      Platelet Count 291      MPV 9.2      Nucleated RBC 0      Neut % 48.8      Lymph % 42.5      Mono % 6.3      Eos % 1.2      Basophil % 0.8      Imm Grans % 0.4      Neut # 2.54      Lymph # 2.21      Mono # 0.33      Eos # 0.06      Baso # 0.04      Imm Grans # 0.02     COMPREHENSIVE METABOLIC PANEL - Normal    Sodium 138      Potassium 4.1      Chloride 103      CO2 24      Glucose 92      BUN 9      Creatinine 1.0      Calcium 9.1      Protein Total 7.4      Albumin 4.1      Bilirubin Total 0.5      ALP 54      AST 23      ALT 31       Anion Gap 11      eGFR >60     PROTIME-INR - Normal    PT 10.5      INR 0.9     LIPASE - Normal    Lipase Level 35     CBC W/ AUTO DIFFERENTIAL    Narrative:     The following orders were created for panel order CBC Auto Differential.  Procedure                               Abnormality         Status                     ---------                               -----------         ------                     CBC with Differential[[id number]]       Abnormal            Final result                 Please view results for these tests on the individual orders.   TYPE & SCREEN    Specimen Outdate 05/13/2025 23:59      Group & Rh O POS      Indirect Brandny NEG       EKG Readings: (Independently Interpreted)   Initial Reading: No STEMI.   Sinus bradycardia with sinus arrhythmia rate of 56, rightward axis     ECG Results              EKG 12-lead (Final result)        Collection Time Result Time QRS Duration OHS QTC Calculation    05/10/25 13:14:00 05/12/25 19:09:23 84 366                     Final result by Interface, Lab In [facility] (05/12/25 19:09:27)                   Narrative:    Test Reason : K92.2,    Vent. Rate :  56 BPM     Atrial Rate :  56 BPM     P-R Int : 182 ms          QRS Dur :  84 ms      QT Int : 380 ms       P-R-T Axes :  39 104   2 degrees    QTcB Int : 366 ms    Sinus bradycardia with sinus arrhythmia  Rightward axis  Borderline Abnormal ECG  When compared with ECG of 02-Jan-2025 14:00,  Significant changes have occurred  Confirmed by Deandre Forrester (59) on 5/12/2025 7:09:17 PM    Referred By: AAAREFERRAL SELF           Confirmed By: Deandre Forrester                                  Imaging Results    None          Medications - No data to display    Medical Decision Making  Amount and/or Complexity of Data Reviewed  Labs: ordered. Decision-making details documented in ED Course.  ECG/medicine tests: ordered and independent interpretation performed.    Risk  Prescription drug management.            Scribe  Attestation:   Scribe #1: I performed the above scribed service and the documentation accurately describes the services I performed. I attest to the accuracy of the note.                               Clinical Impression:  Final diagnoses:  [K13.79] Oral bleeding (Primary)          ED Disposition Condition    Discharge Stable          ED Prescriptions    None       Follow-up Information       Follow up With Specialties Details Why Contact Info    Cherelle Brennan MD Otolaryngology Schedule an appointment as soon as possible for a visit   [address]  [place] [address]  [phone]            I, Dilan Byrd, personally performed the services described in this documentation. All medical record entries made by the scribe were at my direction and in my presence. I have reviewed the chart and agree that the record reflects my personal performance and is accurate and complete.      DISCLAIMER: This note was prepared with [employer] voice recognition transcription software. Garbled syntax, mangled pronouns, and other bizarre constructions may be attributed to that software system.         [1]   Social History  Tobacco Use    Smoking status: Former     Types: Cigarettes    Smokeless tobacco: Never    Tobacco comments:     Vaccinations up to date. Patient is in 6th grade.    Substance Use Topics    Alcohol use: No    Drug use: Yes     Types: Marijuana        Dilan Byrd MD  06/10/25 [id number]

## 2025-05-12 LAB
OHS QRS DURATION: 84 MS
OHS QTC CALCULATION: 366 MS